# Patient Record
Sex: FEMALE | Race: WHITE | NOT HISPANIC OR LATINO | Employment: PART TIME | URBAN - METROPOLITAN AREA
[De-identification: names, ages, dates, MRNs, and addresses within clinical notes are randomized per-mention and may not be internally consistent; named-entity substitution may affect disease eponyms.]

---

## 2017-01-16 ENCOUNTER — AMBULATORY - HEALTHEAST (OUTPATIENT)
Dept: VASCULAR SURGERY | Facility: CLINIC | Age: 57
End: 2017-01-16

## 2017-01-16 DIAGNOSIS — E66.09 NON MORBID OBESITY DUE TO EXCESS CALORIES: ICD-10-CM

## 2017-01-16 DIAGNOSIS — I87.303 VENOUS HYPERTENSION OF BOTH LOWER EXTREMITIES: ICD-10-CM

## 2017-01-16 DIAGNOSIS — M79.89 LEFT LEG SWELLING: ICD-10-CM

## 2017-01-17 ENCOUNTER — COMMUNICATION - HEALTHEAST (OUTPATIENT)
Dept: SURGERY | Facility: CLINIC | Age: 57
End: 2017-01-17

## 2017-01-23 ENCOUNTER — COMMUNICATION - HEALTHEAST (OUTPATIENT)
Dept: SURGERY | Facility: CLINIC | Age: 57
End: 2017-01-23

## 2017-02-08 ENCOUNTER — COMMUNICATION - HEALTHEAST (OUTPATIENT)
Dept: VASCULAR SURGERY | Facility: CLINIC | Age: 57
End: 2017-02-08

## 2017-02-09 ENCOUNTER — AMBULATORY - HEALTHEAST (OUTPATIENT)
Dept: VASCULAR SURGERY | Facility: CLINIC | Age: 57
End: 2017-02-09

## 2017-02-09 DIAGNOSIS — M79.89 LEFT LEG SWELLING: ICD-10-CM

## 2017-02-09 DIAGNOSIS — I87.303 VENOUS HYPERTENSION OF BOTH LOWER EXTREMITIES: ICD-10-CM

## 2017-02-09 DIAGNOSIS — C50.919 MALIGNANT NEOPLASM OF BREAST (H): ICD-10-CM

## 2017-02-09 DIAGNOSIS — E28.2 BILATERAL POLYCYSTIC OVARIAN SYNDROME: ICD-10-CM

## 2017-02-09 DIAGNOSIS — E66.09 NON MORBID OBESITY DUE TO EXCESS CALORIES: ICD-10-CM

## 2017-04-12 ENCOUNTER — OFFICE VISIT - HEALTHEAST (OUTPATIENT)
Dept: SURGERY | Facility: CLINIC | Age: 57
End: 2017-04-12

## 2017-04-12 DIAGNOSIS — Z86.69 HX OF MIGRAINE HEADACHES: ICD-10-CM

## 2017-04-12 DIAGNOSIS — E66.01 OBESITY, CLASS III, BMI 40-49.9 (MORBID OBESITY) (H): ICD-10-CM

## 2017-04-12 DIAGNOSIS — Z87.19 HISTORY OF IBS: ICD-10-CM

## 2017-04-12 DIAGNOSIS — R00.0 TACHYCARDIA: ICD-10-CM

## 2017-04-12 DIAGNOSIS — E55.9 VITAMIN D DEFICIENCY: ICD-10-CM

## 2017-04-12 ASSESSMENT — MIFFLIN-ST. JEOR: SCORE: 1496.69

## 2017-04-20 ENCOUNTER — OFFICE VISIT - HEALTHEAST (OUTPATIENT)
Dept: SURGERY | Facility: CLINIC | Age: 57
End: 2017-04-20

## 2017-04-20 ENCOUNTER — AMBULATORY - HEALTHEAST (OUTPATIENT)
Dept: LAB | Facility: HOSPITAL | Age: 57
End: 2017-04-20

## 2017-04-20 DIAGNOSIS — Z87.19 HISTORY OF IBS: ICD-10-CM

## 2017-04-20 DIAGNOSIS — E66.01 OBESITY, CLASS III, BMI 40-49.9 (MORBID OBESITY) (H): ICD-10-CM

## 2017-04-20 DIAGNOSIS — R00.0 TACHYCARDIA: ICD-10-CM

## 2017-04-20 DIAGNOSIS — E66.9 OBESITY (BMI 30-39.9): ICD-10-CM

## 2017-04-20 DIAGNOSIS — Z71.3 NUTRITIONAL COUNSELING: ICD-10-CM

## 2017-04-20 DIAGNOSIS — E55.9 VITAMIN D DEFICIENCY: ICD-10-CM

## 2017-04-20 LAB
CHOLEST SERPL-MCNC: 250 MG/DL
FASTING STATUS PATIENT QL REPORTED: NO
HDLC SERPL-MCNC: 100 MG/DL
LDLC SERPL CALC-MCNC: 138 MG/DL
TRIGL SERPL-MCNC: 62 MG/DL

## 2017-04-20 ASSESSMENT — MIFFLIN-ST. JEOR: SCORE: 1456.77

## 2017-04-23 ENCOUNTER — COMMUNICATION - HEALTHEAST (OUTPATIENT)
Dept: SURGERY | Facility: CLINIC | Age: 57
End: 2017-04-23

## 2017-04-30 ENCOUNTER — COMMUNICATION - HEALTHEAST (OUTPATIENT)
Dept: SURGERY | Facility: CLINIC | Age: 57
End: 2017-04-30

## 2017-05-10 ENCOUNTER — OFFICE VISIT - HEALTHEAST (OUTPATIENT)
Dept: VASCULAR SURGERY | Facility: CLINIC | Age: 57
End: 2017-05-10

## 2017-05-10 DIAGNOSIS — C50.919 MALIGNANT NEOPLASM OF BREAST (H): ICD-10-CM

## 2017-05-10 DIAGNOSIS — M79.89 LEFT LEG SWELLING: ICD-10-CM

## 2017-05-10 DIAGNOSIS — I87.303 VENOUS HYPERTENSION OF BOTH LOWER EXTREMITIES: ICD-10-CM

## 2017-05-10 DIAGNOSIS — I87.2 VENOUS INSUFFICIENCY OF LEFT LEG: ICD-10-CM

## 2017-05-10 DIAGNOSIS — E66.09 NON MORBID OBESITY DUE TO EXCESS CALORIES: ICD-10-CM

## 2017-05-18 ENCOUNTER — OFFICE VISIT - HEALTHEAST (OUTPATIENT)
Dept: SURGERY | Facility: CLINIC | Age: 57
End: 2017-05-18

## 2017-05-18 DIAGNOSIS — E66.9 OBESITY WITH BODY MASS INDEX OF 30.0-39.9: ICD-10-CM

## 2017-05-18 DIAGNOSIS — Z71.3 NUTRITIONAL COUNSELING: ICD-10-CM

## 2017-05-18 ASSESSMENT — MIFFLIN-ST. JEOR: SCORE: 1448.15

## 2017-06-02 ENCOUNTER — OFFICE VISIT - HEALTHEAST (OUTPATIENT)
Dept: SURGERY | Facility: CLINIC | Age: 57
End: 2017-06-02

## 2017-06-02 DIAGNOSIS — Z71.3 WEIGHT LOSS COUNSELING, ENCOUNTER FOR: ICD-10-CM

## 2017-06-02 DIAGNOSIS — E66.09 NON MORBID OBESITY DUE TO EXCESS CALORIES: ICD-10-CM

## 2017-06-02 ASSESSMENT — MIFFLIN-ST. JEOR: SCORE: 1463.12

## 2017-06-22 ENCOUNTER — COMMUNICATION - HEALTHEAST (OUTPATIENT)
Dept: SURGERY | Facility: CLINIC | Age: 57
End: 2017-06-22

## 2017-08-16 ENCOUNTER — OFFICE VISIT - HEALTHEAST (OUTPATIENT)
Dept: SURGERY | Facility: CLINIC | Age: 57
End: 2017-08-16

## 2017-08-16 DIAGNOSIS — Z71.3 WEIGHT LOSS COUNSELING, ENCOUNTER FOR: ICD-10-CM

## 2017-08-16 DIAGNOSIS — E66.01 OBESITY, CLASS III, BMI 40-49.9 (MORBID OBESITY) (H): ICD-10-CM

## 2017-08-16 ASSESSMENT — MIFFLIN-ST. JEOR: SCORE: 1415.04

## 2017-12-14 ENCOUNTER — OFFICE VISIT - HEALTHEAST (OUTPATIENT)
Dept: VASCULAR SURGERY | Facility: CLINIC | Age: 57
End: 2017-12-14

## 2017-12-14 DIAGNOSIS — M79.671 FOOT PAIN, BILATERAL: ICD-10-CM

## 2017-12-14 DIAGNOSIS — E28.2 BILATERAL POLYCYSTIC OVARIAN SYNDROME: ICD-10-CM

## 2017-12-14 DIAGNOSIS — M72.2 PLANTAR FASCIITIS, BILATERAL: ICD-10-CM

## 2017-12-14 DIAGNOSIS — I87.2 VENOUS INSUFFICIENCY OF LEFT LEG: ICD-10-CM

## 2017-12-14 DIAGNOSIS — M79.672 FOOT PAIN, BILATERAL: ICD-10-CM

## 2017-12-14 DIAGNOSIS — M79.605 LEFT LEG PAIN: ICD-10-CM

## 2017-12-14 DIAGNOSIS — I73.9 CLAUDICATION (H): ICD-10-CM

## 2017-12-14 DIAGNOSIS — I87.303 VENOUS HYPERTENSION OF BOTH LOWER EXTREMITIES: ICD-10-CM

## 2017-12-14 ASSESSMENT — MIFFLIN-ST. JEOR: SCORE: 1410.5

## 2018-02-12 ENCOUNTER — COMMUNICATION - HEALTHEAST (OUTPATIENT)
Dept: VASCULAR SURGERY | Facility: CLINIC | Age: 58
End: 2018-02-12

## 2018-02-12 DIAGNOSIS — M79.89 LEFT LEG SWELLING: ICD-10-CM

## 2018-02-12 DIAGNOSIS — I87.303 VENOUS HYPERTENSION OF BOTH LOWER EXTREMITIES: ICD-10-CM

## 2018-02-12 DIAGNOSIS — C50.919 MALIGNANT NEOPLASM OF BREAST (H): ICD-10-CM

## 2018-02-12 DIAGNOSIS — E28.2 POLYCYSTIC OVARIAN SYNDROME: ICD-10-CM

## 2018-02-13 ENCOUNTER — RECORDS - HEALTHEAST (OUTPATIENT)
Dept: VASCULAR ULTRASOUND | Facility: CLINIC | Age: 58
End: 2018-02-13

## 2018-02-13 ENCOUNTER — COMMUNICATION - HEALTHEAST (OUTPATIENT)
Dept: VASCULAR SURGERY | Facility: CLINIC | Age: 58
End: 2018-02-13

## 2018-02-13 DIAGNOSIS — I87.2 VENOUS INSUFFICIENCY (CHRONIC) (PERIPHERAL): ICD-10-CM

## 2018-02-13 DIAGNOSIS — I87.303 CHRONIC VENOUS HYPERTENSION (IDIOPATHIC) WITHOUT COMPLICATIONS OF BILATERAL LOWER EXTREMITY: ICD-10-CM

## 2018-02-13 DIAGNOSIS — M72.2 PLANTAR FASCIAL FIBROMATOSIS: ICD-10-CM

## 2018-02-13 DIAGNOSIS — E28.2 POLYCYSTIC OVARIAN SYNDROME: ICD-10-CM

## 2018-02-13 DIAGNOSIS — I73.9 PERIPHERAL VASCULAR DISEASE, UNSPECIFIED (H): ICD-10-CM

## 2018-02-13 DIAGNOSIS — M79.672 PAIN IN LEFT FOOT: ICD-10-CM

## 2018-02-13 DIAGNOSIS — M79.605 PAIN IN LEFT LEG: ICD-10-CM

## 2018-02-13 DIAGNOSIS — M79.671 PAIN IN RIGHT FOOT: ICD-10-CM

## 2018-02-28 ENCOUNTER — RECORDS - HEALTHEAST (OUTPATIENT)
Dept: LAB | Facility: CLINIC | Age: 58
End: 2018-02-28

## 2018-02-28 LAB
ALBUMIN SERPL-MCNC: 3.6 G/DL (ref 3.5–5)
ALP SERPL-CCNC: 110 U/L (ref 45–120)
ALT SERPL W P-5'-P-CCNC: 20 U/L (ref 0–45)
ANION GAP SERPL CALCULATED.3IONS-SCNC: 8 MMOL/L (ref 5–18)
AST SERPL W P-5'-P-CCNC: 21 U/L (ref 0–40)
BILIRUB SERPL-MCNC: 0.3 MG/DL (ref 0–1)
BUN SERPL-MCNC: 14 MG/DL (ref 8–22)
CALCIUM SERPL-MCNC: 9.5 MG/DL (ref 8.5–10.5)
CHLORIDE BLD-SCNC: 105 MMOL/L (ref 98–107)
CO2 SERPL-SCNC: 27 MMOL/L (ref 22–31)
CREAT SERPL-MCNC: 0.72 MG/DL (ref 0.6–1.1)
GFR SERPL CREATININE-BSD FRML MDRD: >60 ML/MIN/1.73M2
GLUCOSE BLD-MCNC: 84 MG/DL (ref 70–125)
POTASSIUM BLD-SCNC: 4.7 MMOL/L (ref 3.5–5)
PROT SERPL-MCNC: 6.4 G/DL (ref 6–8)
SODIUM SERPL-SCNC: 140 MMOL/L (ref 136–145)
TSH SERPL DL<=0.005 MIU/L-ACNC: 1.12 UIU/ML (ref 0.3–5)

## 2018-03-01 ENCOUNTER — RECORDS - HEALTHEAST (OUTPATIENT)
Dept: LAB | Facility: CLINIC | Age: 58
End: 2018-03-01

## 2018-03-01 LAB — ERYTHROCYTE [SEDIMENTATION RATE] IN BLOOD BY WESTERGREN METHOD: 7 MM/HR (ref 0–20)

## 2018-03-23 ENCOUNTER — OFFICE VISIT - HEALTHEAST (OUTPATIENT)
Dept: SURGERY | Facility: CLINIC | Age: 58
End: 2018-03-23

## 2018-03-23 DIAGNOSIS — E66.01 OBESITY, CLASS III, BMI 40-49.9 (MORBID OBESITY) (H): ICD-10-CM

## 2018-03-23 DIAGNOSIS — G43.909 MIGRAINES: ICD-10-CM

## 2018-03-23 ASSESSMENT — MIFFLIN-ST. JEOR: SCORE: 1474.01

## 2018-05-11 ENCOUNTER — COMMUNICATION - HEALTHEAST (OUTPATIENT)
Dept: SURGERY | Facility: CLINIC | Age: 58
End: 2018-05-11

## 2018-05-16 ENCOUNTER — RECORDS - HEALTHEAST (OUTPATIENT)
Dept: LAB | Facility: CLINIC | Age: 58
End: 2018-05-16

## 2018-05-17 LAB — 25(OH)D3 SERPL-MCNC: 32.9 NG/ML (ref 30–80)

## 2019-06-11 ENCOUNTER — RECORDS - HEALTHEAST (OUTPATIENT)
Dept: LAB | Facility: CLINIC | Age: 59
End: 2019-06-11

## 2019-06-11 LAB — ERYTHROCYTE [SEDIMENTATION RATE] IN BLOOD BY WESTERGREN METHOD: 14 MM/HR (ref 0–20)

## 2019-06-12 LAB — LYME TOTAL ANTIBODY - HISTORICAL: 0.05 INDEX VALUE

## 2019-06-13 LAB — ANA SER QL: 0.2 U

## 2019-07-09 ENCOUNTER — RECORDS - HEALTHEAST (OUTPATIENT)
Dept: LAB | Facility: CLINIC | Age: 59
End: 2019-07-09

## 2019-07-11 LAB
GLIADIN IGA SER-ACNC: 0.4 U/ML
GLIADIN IGG SER-ACNC: <0.4 U/ML
IGA SERPL-MCNC: 204 MG/DL (ref 65–400)
TTG IGA SER-ACNC: 0.7 U/ML
TTG IGG SER-ACNC: 4.2 U/ML

## 2019-08-26 ENCOUNTER — TRANSFERRED RECORDS (OUTPATIENT)
Dept: HEALTH INFORMATION MANAGEMENT | Facility: CLINIC | Age: 59
End: 2019-08-26

## 2019-08-28 ENCOUNTER — MEDICAL CORRESPONDENCE (OUTPATIENT)
Dept: HEALTH INFORMATION MANAGEMENT | Facility: CLINIC | Age: 59
End: 2019-08-28

## 2019-09-03 ENCOUNTER — PRE VISIT (OUTPATIENT)
Dept: NEUROLOGY | Facility: CLINIC | Age: 59
End: 2019-09-03

## 2019-09-03 NOTE — TELEPHONE ENCOUNTER
FUTURE VISIT INFORMATION      FUTURE VISIT INFORMATION:    Date: 9/3/2019    Time: 8AM    [place] [facility] [place]ERRAL INFORMATION:    Referring provider:  Dr. Davis    Referring providers clinic:  [facility]     Reason for visit/diagnosis  Migraines     RECORDS REQUESTED FROM:       Clinic name Comments Records Status Imaging Status   [facility]   Scanned to Media N/A

## 2019-09-22 ENCOUNTER — DOCUMENTATION ONLY (OUTPATIENT)
Dept: CARE COORDINATION | Facility: CLINIC | Age: 59
End: 2019-09-22

## 2019-10-11 ASSESSMENT — ENCOUNTER SYMPTOMS
STIFFNESS: 0
RECTAL PAIN: 0
HEARTBURN: 0
NECK PAIN: 1
EYE IRRITATION: 1
SKIN CHANGES: 0
EYE REDNESS: 0
WHEEZING: 1
COUGH DISTURBING SLEEP: 1
POOR WOUND HEALING: 0
HEMATURIA: 0
MUSCLE WEAKNESS: 0
JAUNDICE: 0
FLANK PAIN: 0
ABDOMINAL PAIN: 0
ARTHRALGIAS: 1
BACK PAIN: 1
SPUTUM PRODUCTION: 0
SNORES LOUDLY: 1
DOUBLE VISION: 0
NAIL CHANGES: 0
DYSPNEA ON EXERTION: 0
SHORTNESS OF BREATH: 0
EYE WATERING: 1
DIARRHEA: 1
MYALGIAS: 1
DIFFICULTY URINATING: 0
EYE PAIN: 0
HEMOPTYSIS: 0
BLOATING: 1
BLOOD IN STOOL: 0
BOWEL INCONTINENCE: 0
POSTURAL DYSPNEA: 0
MUSCLE CRAMPS: 1
JOINT SWELLING: 0
DYSURIA: 0
VOMITING: 0
NAUSEA: 1
CONSTIPATION: 0
COUGH: 1

## 2019-10-11 ASSESSMENT — HEADACHE IMPACT TEST (HIT 6)
HOW OFTEN HAVE YOU FELT TOO TIRED TO WORK BECAUSE OF YOUR HEADACHES: SOMETIMES
HIT6 TOTAL SCORE: 63
WHEN YOU HAVE HEADACHES HOW OFTEN IS THE PAIN SEVERE: VERY OFTEN
WHEN YOU HAVE A HEADACHE HOW OFTEN DO YOU WISH YOU COULD LIE DOWN: VERY OFTEN
HOW OFTEN DO HEADACHES LIMIT YOUR DAILY ACTIVITIES: SOMETIMES
HOW OFTEN HAVE YOU FELT FED UP OR IRRITATED BECAUSE OF YOUR HEADACHES: VERY OFTEN
HOW OFTEN DID HEADACHS LIMIT CONCENTRATION ON WORK OR DAILY ACTIVITY: SOMETIMES

## 2019-10-22 ENCOUNTER — OFFICE VISIT (OUTPATIENT)
Dept: NEUROLOGY | Facility: CLINIC | Age: 59
End: 2019-10-22
Payer: MEDICARE

## 2019-10-22 VITALS
RESPIRATION RATE: 16 BRPM | OXYGEN SATURATION: 95 % | WEIGHT: 227 LBS | SYSTOLIC BLOOD PRESSURE: 113 MMHG | BODY MASS INDEX: 42.86 KG/M2 | HEART RATE: 86 BPM | HEIGHT: 61 IN | DIASTOLIC BLOOD PRESSURE: 77 MMHG

## 2019-10-22 DIAGNOSIS — M79.18 CHRONIC MYOFASCIAL PAIN: Primary | ICD-10-CM

## 2019-10-22 DIAGNOSIS — G89.29 CHRONIC MYOFASCIAL PAIN: Primary | ICD-10-CM

## 2019-10-22 PROBLEM — M72.2 PLANTAR FASCIITIS, BILATERAL: Status: ACTIVE | Noted: 2017-12-14

## 2019-10-22 PROBLEM — M79.672 FOOT PAIN, BILATERAL: Status: ACTIVE | Noted: 2017-12-14

## 2019-10-22 PROBLEM — M79.605 LEFT LEG PAIN: Status: ACTIVE | Noted: 2017-12-14

## 2019-10-22 PROBLEM — D05.10 DCIS (DUCTAL CARCINOMA IN SITU): Status: ACTIVE | Noted: 2019-10-22

## 2019-10-22 PROBLEM — I73.9 CLAUDICATION (H): Status: ACTIVE | Noted: 2017-12-14

## 2019-10-22 PROBLEM — Z98.890 S/P LUMPECTOMY OF BREAST: Status: ACTIVE | Noted: 2019-10-22

## 2019-10-22 PROBLEM — F17.200 SMOKER: Status: ACTIVE | Noted: 2019-10-22

## 2019-10-22 PROBLEM — M79.671 FOOT PAIN, BILATERAL: Status: ACTIVE | Noted: 2017-12-14

## 2019-10-22 RX ORDER — EPINEPHRINE 0.3 MG/.3ML
0.3 INJECTION SUBCUTANEOUS PRN
COMMUNITY

## 2019-10-22 RX ORDER — MONTELUKAST SODIUM 10 MG/1
10 TABLET ORAL
COMMUNITY
Start: 2018-10-03 | End: 2022-03-22

## 2019-10-22 RX ORDER — DIPHENHYDRAMINE HCL 50 MG
CAPSULE ORAL
COMMUNITY
Start: 2018-12-06

## 2019-10-22 RX ORDER — SUMATRIPTAN 50 MG/1
50-100 TABLET, FILM COATED ORAL
Qty: 12 TABLET | Refills: 6 | Status: SHIPPED | OUTPATIENT
Start: 2019-10-22 | End: 2020-11-11

## 2019-10-22 RX ORDER — CYANOCOBALAMIN 1000 UG/ML
1000 INJECTION, SOLUTION INTRAMUSCULAR; SUBCUTANEOUS
COMMUNITY
End: 2022-03-22

## 2019-10-22 RX ORDER — BENZONATATE 100 MG/1
CAPSULE ORAL
Refills: 0 | COMMUNITY
Start: 2019-10-04 | End: 2022-03-22

## 2019-10-22 RX ORDER — LATANOPROST 50 UG/ML
SOLUTION/ DROPS OPHTHALMIC
COMMUNITY
Start: 2018-09-08 | End: 2022-03-22

## 2019-10-22 ASSESSMENT — PAIN SCALES - GENERAL: PAINLEVEL: MILD PAIN (3)

## 2019-10-22 ASSESSMENT — PATIENT HEALTH QUESTIONNAIRE - PHQ9
10. IF YOU CHECKED OFF ANY PROBLEMS, HOW DIFFICULT HAVE THESE PROBLEMS MADE IT FOR YOU TO DO YOUR WORK, TAKE CARE OF THINGS AT HOME, OR GET ALONG WITH OTHER PEOPLE: SOMEWHAT DIFFICULT
SUM OF ALL RESPONSES TO PHQ QUESTIONS 1-9: 6
SUM OF ALL RESPONSES TO PHQ QUESTIONS 1-9: 6

## 2019-10-22 ASSESSMENT — MIFFLIN-ST. JEOR: SCORE: 1547.05

## 2019-10-22 NOTE — NURSING NOTE
Chief Complaint   Patient presents with     Headache     [facility] NEW HEADACHE- MIGRAINE CONSULT       Anders King, EMT

## 2019-10-22 NOTE — PATIENT INSTRUCTIONS
Plan:  Headache log for frequency and severity  Physiatry referral for trigger points injections  Physical therapy for myofascial release  Acute headache treatment-naproxen 1-2 tablets every 12 hours as needed. Limit use to no more than 10 days per month. Limit all of your acute medications use OTC to no more than 10 days per month  Sumatriptan injectable or sumatriptan oral tablet  mg at headache onset and may repeat in 2 hours as needed. Max 200 mg in 24 hoursas needed for acute migraine treatment. Limit use to no more than 8 days per month. May take it with naproxen in combination.   Migraine prevention-let me know if you would like to try new treatment-Aimovig or Emgality  Follow up in 3 months or sooner if needed      Patient Education     Sumatriptan tablets  Brand Names: Imitrex, Migraine Pack  What is this medicine?  SUMATRIPTAN (heidi ma TRIP tan) is used to treat migraines with or without aura. An aura is a strange feeling or visual disturbance that warns you of an attack. It is not used to prevent migraines.  How should I use this medicine?  Take this medicine by mouth with a glass of water. Follow the directions on the prescription label. This medicine is taken at the first symptoms of a migraine. It is not for everyday use. If your migraine headache returns after one dose, you can take another dose as directed. You must leave at least 2 hours between doses, and do not take more than 100 mg as a single dose. Do not take more than 200 mg total in any 24 hour period. If there is no improvement at all after the first dose, do not take a second dose without talking to your doctor or health care professional. Do not take your medicine more often than directed.  Talk to your pediatrician regarding the use of this medicine in children. Special care may be needed.  What side effects may I notice from receiving this medicine?  Side effects that you should report to your doctor or health care professional as  soon as possible:    allergic reactions like skin rash, itching or hives, swelling of the face, lips, or tongue    bloody or watery diarrhea    hallucination, loss of contact with reality    pain, tingling, numbness in the face, hands, or feet    seizures    signs and symptoms of a blood clot such as breathing problems; changes in vision; chest pain; severe, sudden headache; pain, swelling, warmth in the leg; trouble speaking; sudden numbness or weakness of the face, arm, or leg    signs and symptoms of a dangerous change in heartbeat or heart rhythm like chest pain; dizziness; fast or irregular heartbeat; palpitations, feeling faint or lightheaded; falls; breathing problems    signs and symptoms of a stroke like changes in vision; confusion; trouble speaking or understanding; severe headaches; sudden numbness or weakness of the face, arm, or leg; trouble walking; dizziness; loss of balance or coordination    stomach pain  Side effects that usually do not require medical attention (report to your doctor or health care professional if they continue or are bothersome):    changes in taste    facial flushing    headache    muscle cramps    muscle pain    nausea, vomiting    weak or tired  What may interact with this medicine?  Do not take this medicine with any of the following medicines:    cocaine    ergot alkaloids like dihydroergotamine, ergonovine, ergotamine, methylergonovine    feverfew    MAOIs like Carbex, Eldepryl, Marplan, Nardil, and Parnate    other medicines for migraine headache like almotriptan, eletriptan, frovatriptan, naratriptan, rizatriptan, zolmitriptan    tryptophan  This medicine may also interact with the following medications:    certain medicines for depression, anxiety, or psychotic disturbances  What if I miss a dose?  This does not apply; this medicine is not for regular use.  Where should I keep my medicine?  Keep out of the reach of children.  Store at room temperature between 2 and 30  degrees C (36 and 86 degrees F). Throw away any unused medicine after the expiration date.  What should I tell my health care provider before I take this medicine?  They need to know if you have any of these conditions:    circulation problems in fingers and toes    diabetes    heart disease    high blood pressure    high cholesterol    history of irregular heartbeat    history of stroke    kidney disease    liver disease    postmenopausal or surgical removal of uterus and ovaries    seizures    smoke tobacco    stomach or intestine problems    an unusual or allergic reaction to sumatriptan, other medicines, foods, dyes, or preservatives    pregnant or trying to get pregnant    breast-feeding  What should I watch for while using this medicine?  Only take this medicine for a migraine headache. Take it if you get warning symptoms or at the start of a migraine attack. It is not for regular use to prevent migraine attacks.  You may get drowsy or dizzy. Do not drive, use machinery, or do anything that needs mental alertness until you know how this medicine affects you. To reduce dizzy or fainting spells, do not sit or stand up quickly, especially if you are an older patient. Alcohol can increase drowsiness, dizziness and flushing. Avoid alcoholic drinks.  Smoking cigarettes may increase the risk of heart-related side effects from using this medicine.  If you take migraine medicines for 10 or more days a month, your migraines may get worse. Keep a diary of headache days and medicine use. Contact your healthcare professional if your migraine attacks occur more frequently.  NOTE:This sheet is a summary. It may not cover all possible information. If you have questions about this medicine, talk to your doctor, pharmacist, or health care provider. Copyright  2019 [employer]
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
## 2019-10-23 ENCOUNTER — RECORDS - HEALTHEAST (OUTPATIENT)
Dept: LAB | Facility: CLINIC | Age: 59
End: 2019-10-23

## 2019-10-23 ASSESSMENT — PATIENT HEALTH QUESTIONNAIRE - PHQ9
SUM OF ALL RESPONSES TO PHQ QUESTIONS 1-9: 6
SUM OF ALL RESPONSES TO PHQ QUESTIONS 1-9: 9
10. IF YOU CHECKED OFF ANY PROBLEMS, HOW DIFFICULT HAVE THESE PROBLEMS MADE IT FOR YOU TO DO YOUR WORK, TAKE CARE OF THINGS AT HOME, OR GET ALONG WITH OTHER PEOPLE: SOMEWHAT DIFFICULT
SUM OF ALL RESPONSES TO PHQ QUESTIONS 1-9: 9

## 2019-10-23 ASSESSMENT — ANXIETY QUESTIONNAIRES
2. NOT BEING ABLE TO STOP OR CONTROL WORRYING: SEVERAL DAYS
4. TROUBLE RELAXING: NEARLY EVERY DAY
7. FEELING AFRAID AS IF SOMETHING AWFUL MIGHT HAPPEN: NOT AT ALL
7. FEELING AFRAID AS IF SOMETHING AWFUL MIGHT HAPPEN: NOT AT ALL
3. WORRYING TOO MUCH ABOUT DIFFERENT THINGS: SEVERAL DAYS
5. BEING SO RESTLESS THAT IT IS HARD TO SIT STILL: NOT AT ALL
GAD7 TOTAL SCORE: 9
6. BECOMING EASILY ANNOYED OR IRRITABLE: MORE THAN HALF THE DAYS
1. FEELING NERVOUS, ANXIOUS, OR ON EDGE: MORE THAN HALF THE DAYS

## 2019-10-24 LAB — HBA1C MFR BLD: 5.4 % (ref 4.2–6.1)

## 2019-10-24 ASSESSMENT — ANXIETY QUESTIONNAIRES: GAD7 TOTAL SCORE: 9

## 2019-10-24 ASSESSMENT — PATIENT HEALTH QUESTIONNAIRE - PHQ9: SUM OF ALL RESPONSES TO PHQ QUESTIONS 1-9: 9

## 2019-10-25 ENCOUNTER — OFFICE VISIT (OUTPATIENT)
Dept: PHYSICAL MEDICINE AND REHAB | Facility: CLINIC | Age: 59
End: 2019-10-25
Payer: MEDICARE

## 2019-10-25 VITALS
BODY MASS INDEX: 42.86 KG/M2 | WEIGHT: 227 LBS | HEART RATE: 78 BPM | RESPIRATION RATE: 16 BRPM | HEIGHT: 61 IN | SYSTOLIC BLOOD PRESSURE: 141 MMHG | DIASTOLIC BLOOD PRESSURE: 90 MMHG | OXYGEN SATURATION: 99 %

## 2019-10-25 DIAGNOSIS — M79.18 MYOFASCIAL PAIN: Primary | ICD-10-CM

## 2019-10-25 RX ORDER — TRIAMCINOLONE ACETONIDE 40 MG/ML
40 INJECTION, SUSPENSION INTRA-ARTICULAR; INTRAMUSCULAR ONCE
Status: COMPLETED | OUTPATIENT
Start: 2019-10-25 | End: 2019-10-25

## 2019-10-25 RX ORDER — BUPIVACAINE HYDROCHLORIDE 5 MG/ML
5 INJECTION, SOLUTION EPIDURAL; INTRACAUDAL ONCE
Status: COMPLETED | OUTPATIENT
Start: 2019-10-25 | End: 2019-10-25

## 2019-10-25 RX ADMIN — BUPIVACAINE HYDROCHLORIDE 25 MG: 5 INJECTION, SOLUTION EPIDURAL; INTRACAUDAL at 15:20

## 2019-10-25 RX ADMIN — TRIAMCINOLONE ACETONIDE 40 MG: 40 INJECTION, SUSPENSION INTRA-ARTICULAR; INTRAMUSCULAR at 15:21

## 2019-10-25 ASSESSMENT — MIFFLIN-ST. JEOR: SCORE: 1547.05

## 2019-10-25 ASSESSMENT — PAIN SCALES - GENERAL: PAINLEVEL: SEVERE PAIN (7)

## 2019-10-25 NOTE — NURSING NOTE
Chief Complaint   Patient presents with     Consult     [facility] NEW TRIGGER POINT INJECTIONS- CHRONIC MYOFACIAL PAIN       Anders King, EMT

## 2019-10-25 NOTE — PROGRESS NOTES
"[facility]  Physical Medicine & Rehabilitation Consultation    Snow Hawthorne   YOB: 1960  MRN: [id number]   Date of Service: 10/25/19    Requesting Provider: DELORIS Lackey CNP      History of Present Illness:  Snow Hawthorne is a 59 year old female with a history of chronic migraine headaches who is referred to clinic for consideration of trigger point injections for management of myofascial pain in her neck and shoulder muscles. Patient feels that her migraines stem from excessive muscle tension/tightness in her neck and shoulder muscles. Her left shoulder muscles are always more tight and painful than her right side.     Patient has had oral steroids for myofascial pain, but felt that this made her \"bitchy.\" She has had Dysport injections in the past for management of her migraines, and unfortunately had a negative reaction which included nausea, vomiting and malaise. Other treatment modalities tried include massage therapy, acupuncture and chiropractic adjustments without any lasting benefit.     Past Medical History:  Chronic migraine headaches  Asthma  Claudication  Obesity  Venous hypertension, BLE  Major depression      Social History     Socioeconomic History     Marital status:      Spouse name: Not on file     Number of children: Not on file     Years of education: Not on file     Highest education level: Not on file   Occupational History     Not on file   Social Needs     Financial resource strain: Not on file     Food insecurity:     Worry: Not on file     Inability: Not on file     Transportation needs:     Medical: Not on file     Non-medical: Not on file   Tobacco Use     Smoking status: Former Smoker     Packs/day: 1.00     Years: 20.00     Pack years: 20.00     Smokeless tobacco: Never Used     Tobacco comment: quit 2 mo ago   Substance and Sexual Activity     Alcohol use: Not Currently     Drug " use: Not Currently     Sexual activity: Not on file   Lifestyle     Physical activity:     Days per week: Not on file     Minutes per session: Not on file     Stress: Not on file   Relationships     Social connections:     Talks on phone: Not on file     Gets together: Not on file     Attends Christian service: Not on file     Active member of club or organization: Not on file     Attends meetings of clubs or organizations: Not on file     Relationship status: Not on file     Intimate partner violence:     Fear of current or ex partner: Not on file     Emotionally abused: Not on file     Physically abused: Not on file     Forced sexual activity: Not on file   Other Topics Concern     Not on file   Social History Narrative     Not on file       Current Outpatient Medications   Medication Sig Dispense Refill     benzonatate (TESSALON) 100 MG capsule TAKE 1 CAPSULE BY MOUTH THREE TIMES A DAY  0     cetirizine HCl 10 MG CAPS Takes one capsule daily.       cyanocobalamin (CYANOCOBALAMIN) 1000 MCG/ML injection Inject 1,000 mcg into the muscle       diphenhydrAMINE (BENADRYL) 50 MG capsule Take 1 tablet (50 mg) 1 hour prior to your CT scan.       EFFEXOR 75 MG OR TABS  5 at hs (Patient taking differently: 225 mg ) 60 0     EPINEPHrine (EPIPEN/ADRENACLICK/OR ANY BX GENERIC EQUIV) 0.3 MG/0.3ML injection 2-pack Inject 0.3 mg into the muscle as needed       fluticasone (VERAMYST) 27.5 MCG/SPRAY nasal spray Spray 2 sprays in nostril       IMITREX STATDOSE 6 MG/0.5ML SC KIT 1 at onset, repeat in 1 hr prn  0     KETOROLAC 10 MG OR TABS 1 TABLET EVERY 4 TO 6 HOURS AS NEEDED 20 0     latanoprost (XALATAN) 0.005 % ophthalmic solution INSTILL ONE DROP IN EACH EYE AT BEDTIME       montelukast (SINGULAIR) 10 MG tablet Take 10 mg by mouth       Riboflavin-Magnesium-Feverfew (MIGRELIEF) 200-180-50 MG TABS        SUMAtriptan (IMITREX) 50 MG tablet Take 1-2 tablets ( mg) by mouth at onset of headache for migraine (may repeat in 2  "hours as needed. Max 200 mg in 24 hours) 12 tablet 6     tiZANidine (ZANAFLEX) 4 MG tablet PLEASE SEE ATTACHED FOR DETAILED DIRECTIONS  3     COMPAZINE 10 MG OR TABS 1 TABLET 3 TIMES DAILY AS NEEDED (Patient not taking: Reported on 10/22/2019) 10 0         Allergies   Allergen Reactions     Atenolol Anaphylaxis and Other (See Comments)     Hypotension       Diatrizoate Anaphylaxis     Other reaction(s): *Unknown  IV Contrast Dye       Nitrofurantoin Anaphylaxis     Macrobid       Ciprofloxacin Other (See Comments)     \"becomes irritable\"  \"becomes irritable\"       Clotrimazole Itching     Contrast Dye      IVP     Metronidazole Other (See Comments)     Itching inside     Penicillins      Silver Nitrate Other (See Comments)     Other reaction(s): Irritation At Inj Site  Causes skin infection       Sulfa Drugs      Adhesive Tape Itching and Rash       Physical Examination:  VS: BP (!) 141/90   Pulse 78   Resp 16   Ht 1.549 m (5' 1\")   Wt 103 kg (227 lb)   LMP 10/12/2003   SpO2 99%   BMI 42.89 kg/m       Pleasant and cooperative, in no acute distress  No lesions or abrasions on exposed skin  Myofascial trigger points noted in bilateral upper trapezius muscles, L>R      Procedure:     Drug:   Bupivacaine 0.5%: 5 ml (Lot: [id number], Exp: 2022, NDC: 55150-169-10)  Kenalo mg = 1 ml (Lot: [id number], Exp: 2021, NDC: [address]-8)    Prior to the start of the procedure and with procedural staff participation, I verbally confirmed the patient s identity using two indicators, relevant allergies, that the procedure was appropriate and matched the consent or emergent situation, and that the correct equipment/implants were available. Immediately prior to starting the procedure I conducted the Time Out with the procedural staff and re-confirmed the patient s name, procedure, and site/side. (The Joint Commission universal protocol was followed.)  Yes    Sedation (Moderate or Deep): None      Trigger Point " Injections    Areas were prepped with ChloraPrep.  Using standard precautions a 25 mm EMG needle with EMG guidance was used to inject a mixture of 0.5% bupivacaine and kenalog into the left trapezius muscles for a total of 12 site/s, and the right trapezius muscles for a total of 3 site/s.  Patient tolerated the procedure well and there were no complications.        Snow Hawthorne tolerated the procedure well without any immediate complications. She was allowed to recover for an appropriate period of time and was discharged home in stable condition.  Patient will follow-up regarding response to this procedure.      Assessment & Recommendations:  Snow Hawthorne is a 59 year old female who presents to rehabilitation clinic for trigger point injections for management of myofascial pain in her neck and shoulder girdle musculature.  Snow Hawthorne tolerated the procedure well and noted reduction in baseline pain following the procedure. We did discuss at today's visit that if the patient experiences resolution or improvement in pain that lasts > 1 month, we may repeat these injections no sooner than 3 months from today.         Thank you for this consultation. Please do not hesitate to contact me with further questions.   Lakisha Luque MD  Physical Medicine and Rehabilitation  [phone]      I spent a total of 45 minutes face-to-face and managing the care of Snow Hawthorne. Over 50% of my time was spent counseling the patient and coordinating care. Please see note for details.

## 2019-10-25 NOTE — LETTER
"10/25/2019       RE: Snow Hawthorne  [address] [place] [id number]     Dear Colleague,    Thank you for referring your patient, Snow Hawthorne, to the [facility] PHYSICAL MEDICINE AND REHABILITATION at [facility]. Please see a copy of my visit note below.    [facility]  Clinics & [facility] Center  Physical Medicine & Rehabilitation Consultation    Snow Hawthorne   YOB: 1960  MRN: [id number]   Date of Service: 10/25/19    Requesting Provider: DELORIS Lackey CNP      History of Present Illness:  Snow Hawthorne is a 59 year old female with a history of chronic migraine headaches who is referred to clinic for consideration of trigger point injections for management of myofascial pain in her neck and shoulder muscles. Patient feels that her migraines stem from excessive muscle tension/tightness in her neck and shoulder muscles. Her left shoulder muscles are always more tight and painful than her right side.     Patient has had oral steroids for myofascial pain, but felt that this made her \"bitchy.\" She has had Dysport injections in the past for management of her migraines, and unfortunately had a negative reaction which included nausea, vomiting and malaise. Other treatment modalities tried include massage therapy, acupuncture and chiropractic adjustments without any lasting benefit.     Past Medical History:  Chronic migraine headaches  Asthma  Claudication  Obesity  Venous hypertension, BLE  Major depression      Social History     Socioeconomic History     Marital status:      Spouse name: Not on file     Number of children: Not on file     Years of education: Not on file     Highest education level: Not on file   Occupational History     Not on file   Social Needs     Financial resource strain: Not on file     Food insecurity:     Worry: Not on file     Inability: Not on file "     Transportation needs:     Medical: Not on file     Non-medical: Not on file   Tobacco Use     Smoking status: Former Smoker     Packs/day: 1.00     Years: 20.00     Pack years: 20.00     Smokeless tobacco: Never Used     Tobacco comment: quit 2 mo ago   Substance and Sexual Activity     Alcohol use: Not Currently     Drug use: Not Currently     Sexual activity: Not on file   Lifestyle     Physical activity:     Days per week: Not on file     Minutes per session: Not on file     Stress: Not on file   Relationships     Social connections:     Talks on phone: Not on file     Gets together: Not on file     Attends Zoroastrianism service: Not on file     Active member of club or organization: Not on file     Attends meetings of clubs or organizations: Not on file     Relationship status: Not on file     Intimate partner violence:     Fear of current or ex partner: Not on file     Emotionally abused: Not on file     Physically abused: Not on file     Forced sexual activity: Not on file   Other Topics Concern     Not on file   Social History Narrative     Not on file       Current Outpatient Medications   Medication Sig Dispense Refill     benzonatate (TESSALON) 100 MG capsule TAKE 1 CAPSULE BY MOUTH THREE TIMES A DAY  0     cetirizine HCl 10 MG CAPS Takes one capsule daily.       cyanocobalamin (CYANOCOBALAMIN) 1000 MCG/ML injection Inject 1,000 mcg into the muscle       diphenhydrAMINE (BENADRYL) 50 MG capsule Take 1 tablet (50 mg) 1 hour prior to your CT scan.       EFFEXOR 75 MG OR TABS  5 at hs (Patient taking differently: 225 mg ) 60 0     EPINEPHrine (EPIPEN/ADRENACLICK/OR ANY BX GENERIC EQUIV) 0.3 MG/0.3ML injection 2-pack Inject 0.3 mg into the muscle as needed       fluticasone (VERAMYST) 27.5 MCG/SPRAY nasal spray Spray 2 sprays in nostril       IMITREX STATDOSE 6 MG/0.5ML SC KIT 1 at onset, repeat in 1 hr prn  0     KETOROLAC 10 MG OR TABS 1 TABLET EVERY 4 TO 6 HOURS AS NEEDED 20 0     latanoprost (XALATAN)  "0.005 % ophthalmic solution INSTILL ONE DROP IN EACH EYE AT BEDTIME       montelukast (SINGULAIR) 10 MG tablet Take 10 mg by mouth       Riboflavin-Magnesium-Feverfew (MIGRELIEF) 200-180-50 MG TABS        SUMAtriptan (IMITREX) 50 MG tablet Take 1-2 tablets ( mg) by mouth at onset of headache for migraine (may repeat in 2 hours as needed. Max 200 mg in 24 hours) 12 tablet 6     tiZANidine (ZANAFLEX) 4 MG tablet PLEASE SEE ATTACHED FOR DETAILED DIRECTIONS  3     COMPAZINE 10 MG OR TABS 1 TABLET 3 TIMES DAILY AS NEEDED (Patient not taking: Reported on 10/22/2019) 10 0         Allergies   Allergen Reactions     Atenolol Anaphylaxis and Other (See Comments)     Hypotension       Diatrizoate Anaphylaxis     Other reaction(s): *Unknown  IV Contrast Dye       Nitrofurantoin Anaphylaxis     Macrobid       Ciprofloxacin Other (See Comments)     \"becomes irritable\"  \"becomes irritable\"       Clotrimazole Itching     Contrast Dye      IVP     Metronidazole Other (See Comments)     Itching inside     Penicillins      Silver Nitrate Other (See Comments)     Other reaction(s): Irritation At Inj Site  Causes skin infection       Sulfa Drugs      Adhesive Tape Itching and Rash       Physical Examination:  VS: BP (!) 141/90   Pulse 78   Resp 16   Ht 1.549 m (5' 1\")   Wt 103 kg (227 lb)   LMP 10/12/2003   SpO2 99%   BMI 42.89 kg/m        Pleasant and cooperative, in no acute distress  No lesions or abrasions on exposed skin  Myofascial trigger points noted in bilateral upper trapezius muscles, L>R      Procedure:     Drug:   Bupivacaine 0.5%: 5 ml (Lot: [id number], Exp: 2022, NDC: 55150-169-10)  [place] mg = 1 ml (Lot: [id number], Exp: 2021, NDC: [address]-7)    Prior to the start of the procedure and with procedural staff participation, I verbally confirmed the patient s identity using two indicators, relevant allergies, that the procedure was appropriate and matched the consent or emergent situation, and that " the correct equipment/implants were available. Immediately prior to starting the procedure I conducted the Time Out with the procedural staff and re-confirmed the patient s name, procedure, and site/side. (The Joint Commission universal protocol was followed.)  Yes    Sedation (Moderate or Deep): None      Trigger Point Injections    Areas were prepped with ChloraPrep.  Using standard precautions a 25 mm EMG needle with EMG guidance was used to inject a mixture of 0.5% bupivacaine and kenalog into the left trapezius muscles for a total of 12 site/s, and the right trapezius muscles for a total of 3 site/s.  Patient tolerated the procedure well and there were no complications.        Snow Hawthorne tolerated the procedure well without any immediate complications. She was allowed to recover for an appropriate period of time and was discharged home in stable condition.  Patient will follow-up regarding response to this procedure.      Assessment & Recommendations:  Snow Hawthorne is a 59 year old female who presents to rehabilitation clinic for trigger point injections for management of myofascial pain in her neck and shoulder girdle musculature.  Snow Hawthorne tolerated the procedure well and noted reduction in baseline pain following the procedure. We did discuss at today's visit that if the patient experiences resolution or improvement in pain that lasts > 1 month, we may repeat these injections no sooner than 3 months from today.     Thank you for this consultation. Please do not hesitate to contact me with further questions.   Lakisha Luque MD  Physical Medicine and Rehabilitation  [phone]    I spent a total of 45 minutes face-to-face and managing the care of Snow Hawthorne. Over 50% of my time was spent counseling the patient and coordinating care. Please see note for details.

## 2019-10-26 ENCOUNTER — HEALTH MAINTENANCE LETTER (OUTPATIENT)
Age: 59
End: 2019-10-26

## 2019-10-28 ENCOUNTER — THERAPY VISIT (OUTPATIENT)
Dept: PHYSICAL THERAPY | Facility: CLINIC | Age: 59
End: 2019-10-28
Payer: MEDICARE

## 2019-10-28 DIAGNOSIS — M54.2 NECK PAIN: ICD-10-CM

## 2019-10-28 PROCEDURE — 97110 THERAPEUTIC EXERCISES: CPT | Mod: GP | Performed by: PHYSICAL THERAPIST

## 2019-10-28 PROCEDURE — 97161 PT EVAL LOW COMPLEX 20 MIN: CPT | Mod: GP | Performed by: PHYSICAL THERAPIST

## 2019-10-28 NOTE — LETTER
DEPARTMENT OF HEALTH AND HUMAN SERVICES  CENTERS FOR MEDICARE & MEDICAID SERVICES    PLAN/UPDATED PLAN OF PROGRESS FOR OUTPATIENT REHABILITATION    PATIENTS NAME:  Snow Koch   : 1960  PROVIDER NUMBER:    [id number]  HICN:[id number]    PROVIDER NAME: [facility]  MEDICAL RECORD NUMBER: [id number]   START OF CARE DATE:  SOC Date: 10/28/19   TYPE:  PT  PRIMARY/TREATMENT DIAGNOSIS: (Pertinent Medical Diagnosis)  Neck pain  VISITS FROM START OF CARE:  Rxs Used: 1     [facility] Initial Evaluation  Subjective:  The history is provided by the patient. No  was used. Snow Hawthorne being seen for neck pain & headaches. Problem began 10/25/2019 (date of orders). Where condition occurred: for unknown reasons.Problem occurred: unknown cause  and reported as 6/10 on pain scale. General health as reported by patient is good. Pertinent medical history includes:  Cancer, depression, migraines/headaches and overweight.  Medical allergies: adhesives and other. Other medical allergies details: see chart.  Surgeries include:  Cancer surgery.  Current medications:  Anti-depressants, muscle relaxants, sleep medication, steroids and other. Other medications details: Natural.   Primary job tasks include:  Driving, lifting/carrying and repetitive tasks.  Pain is described as aching, burning, sharp and stabbing and is constant. Pain is the same all the time. Since onset symptoms are gradually improving.  Previous treatment includes physical therapy, chiropractic and other. There was moderate improvement following previous treatment.   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 This is a chronic condition   Problem details: Patient reports history  of neck pain and headaches for many years with no precipitating event at onset. She reports that she had injections on Friday and has been feeling better since then. .   Patient reports pain:  Cervical left side and cervical right side (L>R). Radiates to:  Head, upper arm left, lower arm left and other (upper back). Associated symptoms:  Headache, dizziness and loss of motion/stiffness. Symptoms are exacerbated by certain positions, driving, lifting and other (cold weather) and relieved by other (injections).    Objective:  System  Physical Exam  [place] Cervical Evaluation  Posture:  Sitting: fair  Standing: good  Protruding Head: no  Wry Neck: no  Correction of Posture: no effect  Movement Loss:  Protrusion (PRO): nil and pain  Flexion (Flex): nil and pain  Retraction (RET): mod and pain  Extension (EXT): mod and pain  Lateral Flexion Right (LF R): min and pain  Lateral Flexion Left (LF L): min and pain  Rotation Right (ROT R): min and pain  Rotation Left (ROT L): mod and pain  Test Movements:  RET: During: increases  After: no worse    Repeat RET: During: increases  After: no worse  Mechanical Response: IncROM    Assessment/Plan:    Patient is a 59 year old female with cervical complaints.    Patient has the following significant findings with corresponding treatment plan.                Diagnosis 1:  Neck pain/headaches  Pain -  self management, education, directional preference exercise and home program  Decreased ROM/flexibility - manual therapy, therapeutic exercise and home program  Impaired muscle performance - neuro re-education and home program  Decreased function - therapeutic activities and home program  Impaired posture - neuro re-education and home program  Therapy Evaluation Codes:   Cumulative Therapy Evaluation is: Low complexity.  Previous and current functional limitations:  (See Goal Flow Sheet for this information)    Short term and Long term goals: (See Goal Flow Sheet for this information)  "  Communication ability:  Patient appears to be able to clearly communicate and understand verbal and written communication and follow directions correctly.  Treatment Explanation - The following has been discussed with the patient:   RX ordered/plan of care  Anticipated outcomes  Possible risks and side effects  This patient would benefit from PT intervention to resume normal activities.   Rehab potential is good.  Frequency:  1 X week, once daily  Duration:  for 6 weeks  Discharge Plan:  Achieve all LTG.  Independent in home treatment program.  Reach maximal therapeutic benefit.      Caregiver Signature/Credentials _____________________________ Date ________        Brandon Diaz DPT   I have reviewed and certified the need for these services and plan of treatment while under my care.      PHYSICIAN'S SIGNATURE:   _________________________________________  Date___________  Francesca Sandy CNP    Certification period:  Beginning of Cert date period: 10/28/19 to  End of Cert period date: 01/06/20   Functional Level Progress Report: Please see attached \"Goal Flow sheet for Functional level.\"  ____X____ Continue Services or       ________ DC Services                Service dates: From  SOC Date: 10/28/19 date to present                         "

## 2019-10-28 NOTE — PROGRESS NOTES
[facility] Initial Evaluation  Subjective:  The history is provided by the patient. No  was used.   Snow Hawthorne being seen for neck pain & headaches.   Problem began 10/25/2019 (date of orders). Where condition occurred: for unknown reasons.Problem occurred: unknown cause  and reported as 6/10 on pain scale. General health as reported by patient is good. Pertinent medical history includes:  Cancer, depression, migraines/headaches and overweight.  Medical allergies: adhesives and other. Other medical allergies details: see chart.  Surgeries include:  Cancer surgery.  Current medications:  Anti-depressants, muscle relaxants, sleep medication, steroids and other. Other medications details: Natural.   Primary job tasks include:  Driving, lifting/carrying and repetitive tasks.  Pain is described as aching, burning, sharp and stabbing and is constant. Pain is the same all the time. Since onset symptoms are gradually improving.  Previous treatment includes physical therapy, chiropractic and other. There was moderate improvement following previous treatment.   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    Barriers include:  None as reported by patient.  Red flags:  None as reported by patient.  Type of problem:  Cervical spine   Condition occurred with:  Insidious onset. This is a chronic condition   Problem details: Patient reports history of neck pain and headaches for many years with no precipitating event at onset. She reports that she had injections on Friday and has been feeling better since then. .   Patient reports pain:  Cervical left side and cervical right side (L>R). Radiates to:  Head, upper arm left, lower arm left and other (upper back). Associated symptoms:  Headache, dizziness and loss of motion/stiffness. Symptoms are exacerbated by certain positions, driving, lifting and other (cold weather) and relieved by  other (injections).                      Objective:  System    Physical Exam    [place] Cervical Evaluation    Posture:  Sitting: fair  Standing: good  Protruding Head: no  Wry Neck: no  Correction of Posture: no effect    Movement Loss:  Protrusion (PRO): nil and pain  Flexion (Flex): nil and pain  Retraction (RET): mod and pain  Extension (EXT): mod and pain  Lateral Flexion Right (LF R): min and pain  Lateral Flexion Left (LF L): min and pain  Rotation Right (ROT R): min and pain  Rotation Left (ROT L): mod and pain  Test Movements:      RET: During: increases  After: no worse    Repeat RET: During: increases  After: no worse  Mechanical Response: IncROM                                                                     ROS    Assessment/Plan:    Patient is a 59 year old female with cervical complaints.    Patient has the following significant findings with corresponding treatment plan.                Diagnosis 1:  Neck pain/headaches  Pain -  self management, education, directional preference exercise and home program  Decreased ROM/flexibility - manual therapy, therapeutic exercise and home program  Impaired muscle performance - neuro re-education and home program  Decreased function - therapeutic activities and home program  Impaired posture - neuro re-education and home program    Therapy Evaluation Codes:     Cumulative Therapy Evaluation is: Low complexity.    Previous and current functional limitations:  (See Goal Flow Sheet for this information)    Short term and Long term goals: (See Goal Flow Sheet for this information)     Communication ability:  Patient appears to be able to clearly communicate and understand verbal and written communication and follow directions correctly.  Treatment Explanation - The following has been discussed with the patient:   RX ordered/plan of care  Anticipated outcomes  Possible risks and side effects  This patient would benefit from PT intervention to resume normal  activities.   Rehab potential is good.    Frequency:  1 X week, once daily  Duration:  for 6 weeks  Discharge Plan:  Achieve all LTG.  Independent in home treatment program.  Reach maximal therapeutic benefit.    Please refer to the daily flowsheet for treatment today, total treatment time and time spent performing 1:1 timed codes.

## 2019-11-04 ENCOUNTER — THERAPY VISIT (OUTPATIENT)
Dept: PHYSICAL THERAPY | Facility: CLINIC | Age: 59
End: 2019-11-04
Payer: MEDICARE

## 2019-11-04 DIAGNOSIS — M54.2 NECK PAIN: ICD-10-CM

## 2019-11-04 PROCEDURE — 97112 NEUROMUSCULAR REEDUCATION: CPT | Mod: GP | Performed by: PHYSICAL THERAPIST

## 2019-11-04 PROCEDURE — 97140 MANUAL THERAPY 1/> REGIONS: CPT | Mod: GP | Performed by: PHYSICAL THERAPIST

## 2019-11-04 PROCEDURE — 97110 THERAPEUTIC EXERCISES: CPT | Mod: GP | Performed by: PHYSICAL THERAPIST

## 2019-11-11 ENCOUNTER — THERAPY VISIT (OUTPATIENT)
Dept: PHYSICAL THERAPY | Facility: CLINIC | Age: 59
End: 2019-11-11
Payer: MEDICARE

## 2019-11-11 DIAGNOSIS — M54.2 NECK PAIN: ICD-10-CM

## 2019-11-11 PROCEDURE — 97112 NEUROMUSCULAR REEDUCATION: CPT | Mod: GP | Performed by: PHYSICAL THERAPIST

## 2019-11-11 PROCEDURE — 97140 MANUAL THERAPY 1/> REGIONS: CPT | Mod: GP | Performed by: PHYSICAL THERAPIST

## 2019-11-11 PROCEDURE — 97110 THERAPEUTIC EXERCISES: CPT | Mod: GP | Performed by: PHYSICAL THERAPIST

## 2019-11-15 ENCOUNTER — DOCUMENTATION ONLY (OUTPATIENT)
Dept: NEUROLOGY | Facility: CLINIC | Age: 59
End: 2019-11-15

## 2019-11-15 NOTE — PROGRESS NOTES
Received form from [facility] it has been placed in providers folder for review and signature.

## 2019-11-18 ENCOUNTER — THERAPY VISIT (OUTPATIENT)
Dept: PHYSICAL THERAPY | Facility: CLINIC | Age: 59
End: 2019-11-18
Payer: MEDICARE

## 2019-11-18 DIAGNOSIS — M54.2 NECK PAIN: ICD-10-CM

## 2019-11-18 PROCEDURE — 97112 NEUROMUSCULAR REEDUCATION: CPT | Mod: GP | Performed by: PHYSICAL THERAPIST

## 2019-11-18 PROCEDURE — 97140 MANUAL THERAPY 1/> REGIONS: CPT | Mod: GP | Performed by: PHYSICAL THERAPIST

## 2019-11-18 PROCEDURE — 97110 THERAPEUTIC EXERCISES: CPT | Mod: GP | Performed by: PHYSICAL THERAPIST

## 2019-11-25 ENCOUNTER — THERAPY VISIT (OUTPATIENT)
Dept: PHYSICAL THERAPY | Facility: CLINIC | Age: 59
End: 2019-11-25
Payer: MEDICARE

## 2019-11-25 DIAGNOSIS — M54.2 NECK PAIN: ICD-10-CM

## 2019-11-25 PROCEDURE — 97112 NEUROMUSCULAR REEDUCATION: CPT | Mod: GP | Performed by: PHYSICAL THERAPIST

## 2019-11-25 PROCEDURE — 97110 THERAPEUTIC EXERCISES: CPT | Mod: GP | Performed by: PHYSICAL THERAPIST

## 2019-11-25 PROCEDURE — 97140 MANUAL THERAPY 1/> REGIONS: CPT | Mod: GP | Performed by: PHYSICAL THERAPIST

## 2019-12-02 ENCOUNTER — THERAPY VISIT (OUTPATIENT)
Dept: PHYSICAL THERAPY | Facility: CLINIC | Age: 59
End: 2019-12-02
Payer: MEDICARE

## 2019-12-02 DIAGNOSIS — M54.2 NECK PAIN: ICD-10-CM

## 2019-12-02 PROCEDURE — 97110 THERAPEUTIC EXERCISES: CPT | Mod: GP | Performed by: PHYSICAL THERAPIST

## 2019-12-02 PROCEDURE — 97140 MANUAL THERAPY 1/> REGIONS: CPT | Mod: GP | Performed by: PHYSICAL THERAPIST

## 2019-12-02 NOTE — PROGRESS NOTES
PROGRESS  REPORT    Progress reporting period is from 10/28/19 to 12/2/19.       SUBJECTIVE  Subjective changes noted by patient:  Patient reports she has had a few headaches the last week but these don't feel like they are coming from the neck. She reports the neck feels a little tight but overall doing well    Current Pain level: 2/10.      Initial Pain level: 6/10.   Changes in function:  Yes (See Goal flowsheet attached for changes in current functional level)  Adverse reaction to treatment or activity: None    OBJECTIVE  Changes noted in objective findings:  AROM cervical extension Min loss, bilat rotation WNL with end range tightness to the left. Patient demonstrates with improved sitting posture and improved recruitment of scapular stabilizers     ASSESSMENT/PLAN  Updated problem list and treatment plan: Diagnosis 1:  Neck pain / headaches  Pain -  self management, education, directional preference exercise and home program  Decreased ROM/flexibility - manual therapy, therapeutic exercise and home program  Impaired muscle performance - neuro re-education and home program  Decreased function - therapeutic activities and home program  STG/LTGs have been met or progress has been made towards goals:  Yes (See Goal flow sheet completed today.)  Assessment of Progress: The patient's condition is improving.  Self Management Plans:  Patient has been instructed in a home treatment program.  Patient  has been instructed in self management of symptoms.  I have re-evaluated this patient and find that the nature, scope, duration and intensity of the therapy is appropriate for the medical condition of the patient.  Snow continues to require the following intervention to meet STG and LTG's:  PT    Recommendations:  This patient would benefit from continued therapy.     Frequency:  1 X week, once daily  Duration:  for 3 weeks        Please refer to the daily flowsheet for treatment today, total treatment time and time spent  performing 1:1 timed codes.

## 2019-12-02 NOTE — LETTER
[facility] PHYSICAL THERAPY  [address] [address]  [phone]    2019    Re: Snow Hawthorne   :   1960  MRN:  [id number]   REFERRING PHYSICIAN:   Francesca Sandy    [facility] PHYSICAL THERAPY    Date of Initial Evaluation:  10/28/2019  Visits:  Rxs Used: 6  Reason for Referral:  Neck pain    EVALUATION SUMMARY    PROGRESS  REPORT    Progress reporting period is from 10/28/19 to 19.       SUBJECTIVE  Subjective changes noted by patient:  Patient reports she has had a few headaches the last week but these don't feel like they are coming from the neck. She reports the neck feels a little tight but overall doing well    Current Pain level: 2/10.      Initial Pain level: 6/10.   Changes in function:  Yes (See Goal flowsheet attached for changes in current functional level)  Adverse reaction to treatment or activity: None    OBJECTIVE  Changes noted in objective findings:  AROM cervical extension Min loss, bilat rotation WNL with end range tightness to the left. Patient demonstrates with improved sitting posture and improved recruitment of scapular stabilizers     ASSESSMENT/PLAN  Updated problem list and treatment plan: Diagnosis 1:  Neck pain / headaches  Pain -  self management, education, directional preference exercise and home program  Decreased ROM/flexibility - manual therapy, therapeutic exercise and home program  Impaired muscle performance - neuro re-education and home program  Decreased function - therapeutic activities and home program  STG/LTGs have been met or progress has been made towards goals:  Yes (See Goal flow sheet completed today.)  Assessment of Progress: The patient's condition is improving.  Self Management Plans:  Patient has been instructed in a home treatment program.  Patient  has been instructed in self management of symptoms.  I have re-evaluated this patient and find that the nature, scope, duration and  intensity of the therapy is appropriate for the medical condition of the patient.  Snow continues to require the following intervention to meet STG and LTG's:  PT    Recommendations:  This patient would benefit from continued therapy.     Frequency:  1 X week, once daily  Duration:  for 3 weeks              Thank you for your referral.    INQUIRIES  Therapist: JADYN Galan [facility] PHYSICAL THERAPY  [address] [place] [id number]  Phone: [phone]  Fax: [phone]

## 2020-02-06 ENCOUNTER — TELEPHONE (OUTPATIENT)
Dept: PHYSICAL MEDICINE AND REHAB | Facility: CLINIC | Age: 60
End: 2020-02-06

## 2020-02-06 NOTE — TELEPHONE ENCOUNTER
I called Snow Hawthorne to coordinate scheduling for trigger point injections with Dr. Lakisha Luque.  I was unable to reach her and left voice-mail on her phone with the reason for my call and my contact information.  I requested a call back to coordinate scheduling this appointment and to discuss any concerns or questions she may have regarding this referral.    Currently awaiting call back.

## 2020-02-21 ENCOUNTER — OFFICE VISIT (OUTPATIENT)
Dept: PHYSICAL MEDICINE AND REHAB | Facility: CLINIC | Age: 60
End: 2020-02-21
Payer: MEDICARE

## 2020-02-21 ENCOUNTER — OFFICE VISIT (OUTPATIENT)
Dept: NEUROLOGY | Facility: CLINIC | Age: 60
End: 2020-02-21
Payer: MEDICARE

## 2020-02-21 VITALS
DIASTOLIC BLOOD PRESSURE: 58 MMHG | RESPIRATION RATE: 16 BRPM | HEART RATE: 95 BPM | OXYGEN SATURATION: 99 % | SYSTOLIC BLOOD PRESSURE: 105 MMHG

## 2020-02-21 VITALS
DIASTOLIC BLOOD PRESSURE: 58 MMHG | HEART RATE: 95 BPM | RESPIRATION RATE: 16 BRPM | OXYGEN SATURATION: 99 % | SYSTOLIC BLOOD PRESSURE: 105 MMHG

## 2020-02-21 DIAGNOSIS — M79.18 CHRONIC MYOFASCIAL PAIN: Primary | ICD-10-CM

## 2020-02-21 DIAGNOSIS — G89.29 CHRONIC MYOFASCIAL PAIN: Primary | ICD-10-CM

## 2020-02-21 DIAGNOSIS — M79.18 MYOFASCIAL PAIN: Primary | ICD-10-CM

## 2020-02-21 RX ORDER — TRIAMCINOLONE ACETONIDE 40 MG/ML
40 INJECTION, SUSPENSION INTRA-ARTICULAR; INTRAMUSCULAR ONCE
Status: COMPLETED | OUTPATIENT
Start: 2020-02-21 | End: 2020-02-21

## 2020-02-21 RX ORDER — BUPIVACAINE HYDROCHLORIDE 5 MG/ML
5 INJECTION, SOLUTION EPIDURAL; INTRACAUDAL ONCE
Status: COMPLETED | OUTPATIENT
Start: 2020-02-21 | End: 2020-02-21

## 2020-02-21 RX ORDER — MULTIVIT-MIN/IRON/FOLIC ACID/K 18-600-40
2 CAPSULE ORAL DAILY
COMMUNITY

## 2020-02-21 RX ADMIN — BUPIVACAINE HYDROCHLORIDE 25 MG: 5 INJECTION, SOLUTION EPIDURAL; INTRACAUDAL at 13:47

## 2020-02-21 RX ADMIN — TRIAMCINOLONE ACETONIDE 40 MG: 40 INJECTION, SUSPENSION INTRA-ARTICULAR; INTRAMUSCULAR at 13:48

## 2020-02-21 NOTE — LETTER
2020       RE: Snow Hawthorne  [address] [place] [id number]     Dear Colleague,    Thank you for referring your patient, Snow Hawthorne, to the [facility] PHYSICAL MEDICINE AND REHABILITATION at [facility]. Please see a copy of my visit note below.    [facility]  Clinics & Surgery Center  Physical Medicine & Rehabilitation Consultation    Snow Hawthorne   YOB: 1960  MRN: [id number]   Date of Service: 10/25/19    History of Present Illness:  Snow Hawthorne is a 59 year old female with a history of chronic migraine headaches who is referred to clinic for repeat trigger point injections for management of myofascial pain in her neck and shoulder muscles. Patient feels that her migraines stem from excessive muscle tension/tightness in her neck and shoulder muscles. Her left shoulder muscles are always more tight and painful than her right side.     The patient most recently underwent trigger point injections on 10/25/2019, and this provided excellent relief of her muscle pain for months. Unfortunately, patient developed shingles, and this caused her pain to return. She has been treated for shingles, and does not have any active lesions today.     Physical Examination:  VS: /58   Pulse 95   Resp 16   LMP 10/12/2003   SpO2 99%      Pleasant and cooperative, in no acute distress  No lesions or abrasions on exposed skin  Myofascial trigger points noted in bilateral upper trapezius muscles, L>R    Procedure:     Drug:   Bupivacaine 0.5%: 5 ml (Lot: [id number], Exp: 2023, NDC: 15863-844-95)  Kenalo mg = 1 ml (Lot: [id number], Exp: 10/2021, NDC: [address]-6)    Prior to the start of the procedure and with procedural staff participation, I verbally confirmed the patient s identity using two indicators, relevant allergies, that the procedure was appropriate and matched the consent or  emergent situation, and that the correct equipment/implants were available. Immediately prior to starting the procedure I conducted the Time Out with the procedural staff and re-confirmed the patient s name, procedure, and site/side. (The Joint Commission universal protocol was followed.)  Yes    Sedation (Moderate or Deep): None    Trigger Point Injections    Areas were prepped with ChloraPrep.  Using standard precautions a 25 mm EMG needle with EMG guidance was used to inject a mixture of 0.5% bupivacaine and kenalog into the left trapezius muscles for a total of 12 site/s, and the right trapezius muscles for a total of 4 site/s.  Patient tolerated the procedure well and there were no complications.      Snow Hawthorne tolerated the procedure well without any immediate complications. She was allowed to recover for an appropriate period of time and was discharged home in stable condition.  Patient will follow-up regarding response to this procedure.    Assessment & Recommendations:  Snow Hawthorne is a 59 year old female who presents to rehabilitation clinic for trigger point injections for management of myofascial pain in her neck and shoulder girdle musculature.  Snow Hawthorne tolerated the procedure well and noted reduction in baseline pain following the procedure. We did discuss at today's visit that if the patient experiences resolution or improvement in pain that lasts > 1 month, we may repeat these injections no sooner than 3 months from today.     Again, thank you for allowing me to participate in the care of your patient.      Sincerely,    Lakisha Luque MD

## 2020-02-21 NOTE — PROGRESS NOTES
[facility]  Physical Medicine & Rehabilitation Consultation    Snow Hawthorne   YOB: 1960  MRN: [id number]   Date of Service: 10/25/19      History of Present Illness:  Snow Hawthorne is a 59 year old female with a history of chronic migraine headaches who is referred to clinic for repeat trigger point injections for management of myofascial pain in her neck and shoulder muscles. Patient feels that her migraines stem from excessive muscle tension/tightness in her neck and shoulder muscles. Her left shoulder muscles are always more tight and painful than her right side.     The patient most recently underwent trigger point injections on 10/25/2019, and this provided excellent relief of her muscle pain for months. Unfortunately, patient developed shingles, and this caused her pain to return. She has been treated for shingles, and does not have any active lesions today.       Physical Examination:  VS: /58   Pulse 95   Resp 16   LMP 10/12/2003   SpO2 99%      Pleasant and cooperative, in no acute distress  No lesions or abrasions on exposed skin  Myofascial trigger points noted in bilateral upper trapezius muscles, L>R      Procedure:     Drug:   Bupivacaine 0.5%: 5 ml (Lot: [id number], Exp: 2023, NDC: 62332-390-22)  Kenalo mg = 1 ml (Lot: [id number], Exp: 10/2021, NDC: [address]-1)    Prior to the start of the procedure and with procedural staff participation, I verbally confirmed the patient s identity using two indicators, relevant allergies, that the procedure was appropriate and matched the consent or emergent situation, and that the correct equipment/implants were available. Immediately prior to starting the procedure I conducted the Time Out with the procedural staff and re-confirmed the patient s name, procedure, and site/side. (The Joint Commission universal protocol was followed.)  Yes    Sedation (Moderate or  Deep): None      Trigger Point Injections    Areas were prepped with ChloraPrep.  Using standard precautions a 25 mm EMG needle with EMG guidance was used to inject a mixture of 0.5% bupivacaine and kenalog into the left trapezius muscles for a total of 12 site/s, and the right trapezius muscles for a total of 4 site/s.  Patient tolerated the procedure well and there were no complications.      Snow Hawthorne tolerated the procedure well without any immediate complications. She was allowed to recover for an appropriate period of time and was discharged home in stable condition.  Patient will follow-up regarding response to this procedure.      Assessment & Recommendations:  Snow Hawthorne is a 59 year old female who presents to rehabilitation clinic for trigger point injections for management of myofascial pain in her neck and shoulder girdle musculature.  Snow Hawthorne tolerated the procedure well and noted reduction in baseline pain following the procedure. We did discuss at today's visit that if the patient experiences resolution or improvement in pain that lasts > 1 month, we may repeat these injections no sooner than 3 months from today.

## 2020-02-21 NOTE — PATIENT INSTRUCTIONS
Plan:  Wait and see and call if no effect after trigger points injections  Call our Clinic if headaches persistent and would like to consider preventive treatment.   Follow up as needed

## 2020-02-21 NOTE — LETTER
2/21/2020       RE: Snow Hawthorne  [address] [place] [id number]     Dear Colleague,    Thank you for referring your patient, Snow Hawthorne, to the [facility] NEUROLOGY at [facility]. Please see a copy of my visit note below.    Re: Snow Hawthorne      MRN# [id number]  YOB: 1960  Date of Visit:2/21/2020     OUTPATIENT NEUROLOGY VISIT NOTE    Reason for Visit:  Headache follow up    Interval History:  Snow Hawthorne is a 59-year-old female presents to the clinic today for headache follow up.  History of breast malignancy and s/p right lumpectomy in 2007 and has been seen at [facility] and cancer free for 12 years, s/p chemotherapy and radiation, depression controlled and seeing a psychiatrist and therapist and doing well.    Accompanied by her   Patient reports that she had trigger points on 10/25/19 and headaches were better until shingles. Shingles reported in January of 2019 and right eyelid, scalp and a few in the forehead and was treated with antiviral meds. Has been followed by ophthalmology.    Patient reports almost daily headaches for 3 weeks and sometimes uses sumatriptan injections. Patient reports that she is hoping that trigger points will help this time. Patient declines headache preventive treatment at this time.   Patient reports that she is not comfortable with either CGRPs or Botox  Patient reports that she used sumatriptan inj about 6 times since 1.5 months and reports that its good.       Treatments Tried:    Excedrin Migraine helps but limits to no more than 3 days per week  Sumatriptan 6 mg subcutaneous and helps most of the time but tries not to do the injections more than twice per week. Patient has never tried sumatriptan pills. Never tried naproxen for migraine   Dysport tried 3-4 times at [facility] -worsening headaches and feeling sick for a week after and neck soreness  "and she would be out a whole week with migraines and end out losing her job  Migralief-did not help  Acupuncture, chiropractor, massages  Topiramate 300 mg at bedtime and no releif  Gabapentin 2400 mg daily and did not help  Effexor 450 mg and did not help  Trazadone 100 mg HS and helps with sleep  Compazine -no side effects   Propranolol -did not help  Verapamil-did not help  Possibly tried depakote but does not remember for sure  Cymbalta -\"did not handle it well\"      Headache treatment Plan:  Wait and see and call if no effect after trigger points injections  Call our Clinic if headaches persistent and would like to consider preventive treatment.   Follow up as needed     Past Medical History reviewed   Social History     Tobacco Use     Smoking status: Former Smoker     Packs/day: 1.00     Years: 20.00     Pack years: 20.00     Smokeless tobacco: Never Used     Tobacco comment: quit 2 mo ago   Substance Use Topics     Alcohol use: Not Currently    reviewed and verified with the patient     Allergies   Allergen Reactions     Atenolol Anaphylaxis and Other (See Comments)     Hypotension       Diatrizoate Anaphylaxis     Other reaction(s): *Unknown  IV Contrast Dye       Nitrofurantoin Anaphylaxis     Macrobid       Beta Adrenergic Blockers      SYNCOPY     Ciprofloxacin Other (See Comments)     \"becomes irritable\"  \"becomes irritable\"       Clotrimazole Itching     Contrast Dye      IVP     Metronidazole Other (See Comments)     Itching inside     Penicillins      Silver Nitrate Other (See Comments)     Other reaction(s): Irritation At Inj Site  Causes skin infection       Sulfa Drugs      Adhesive Tape Itching and Rash       Current Outpatient Medications   Medication Sig Dispense Refill     benzonatate (TESSALON) 100 MG capsule TAKE 1 CAPSULE BY MOUTH THREE TIMES A DAY  0     cetirizine HCl 10 MG CAPS Takes one capsule daily.       COMPAZINE 10 MG OR TABS 1 TABLET 3 TIMES DAILY AS NEEDED (Patient not taking: " Reported on 10/22/2019) 10 0     cyanocobalamin (CYANOCOBALAMIN) 1000 MCG/ML injection Inject 1,000 mcg into the muscle       diphenhydrAMINE (BENADRYL) 50 MG capsule Take 1 tablet (50 mg) 1 hour prior to your CT scan.       EFFEXOR 75 MG OR TABS  5 at hs (Patient taking differently: 225 mg ) 60 0     EPINEPHrine (EPIPEN/ADRENACLICK/OR ANY BX GENERIC EQUIV) 0.3 MG/0.3ML injection 2-pack Inject 0.3 mg into the muscle as needed       fluticasone (VERAMYST) 27.5 MCG/SPRAY nasal spray Spray 2 sprays in nostril       IMITREX STATDOSE 6 MG/0.5ML SC KIT 1 at onset, repeat in 1 hr prn  0     KETOROLAC 10 MG OR TABS 1 TABLET EVERY 4 TO 6 HOURS AS NEEDED 20 0     latanoprost (XALATAN) 0.005 % ophthalmic solution INSTILL ONE DROP IN EACH EYE AT BEDTIME       montelukast (SINGULAIR) 10 MG tablet Take 10 mg by mouth       Riboflavin-Magnesium-Feverfew (MIGRELIEF) 200-180-50 MG TABS        SUMAtriptan (IMITREX) 50 MG tablet Take 1-2 tablets ( mg) by mouth at onset of headache for migraine (may repeat in 2 hours as needed. Max 200 mg in 24 hours) 12 tablet 6     tiZANidine (ZANAFLEX) 4 MG tablet PLEASE SEE ATTACHED FOR DETAILED DIRECTIONS  3     Vitamin D3 25 mcg (1000 units) PO tablet Take by mouth daily     reviewed and verified with the patient    Review of Systems:   A 10-point ROS including constitutional, eyes, respiratory, cardiovascular, gastroenterology, genitourinary, integumentary, musculoskeletal, neurology and psychiatric were all negative except as mentioned in the interval history.      General Exam:   /58   Pulse 95   Resp 16   LMP 10/12/2003   SpO2 99%   GEN: Awake, NAD; good eye contact, responses appropriately, healthy appearing. Speech is fluent without dysarthria. EOM intact. There is no nystagmus. Has conjugated gaze. Face is symmetrical. Intact and symmetrical eyebrow and lid raise and eyelid closure, smiles. Hearing Intact to conversation speech. Strength intact in the upper and lower  extremities bilaterally. Normal casual gait.    Assessment and Plan:  See Interval History for our discussion and plan    I discussed all my recommendation with Snow Hawthorne. The patient verbalizes understanding and comfortable with the plan. The patient has our clinic phone number to call with any questions or concerns. All of the patient's questions were answered from the best of my current knowledge.    Time spent with pt answering questions, discussing findings, counseling and coordinating care was more than 50% the appointment time, 15  minutes.     DELORIS Painter, CNP  [facility]

## 2020-02-21 NOTE — PROGRESS NOTES
Re: Snow Hawthorne      MRN# [id number]  YOB: 1960  Date of Visit:2/21/2020     OUTPATIENT NEUROLOGY VISIT NOTE    Reason for Visit:  Headache follow up    Interval History:  Snow Hawthorne is a 59-year-old female presents to the clinic today for headache follow up.  History of breast malignancy and s/p right lumpectomy in 2007 and has been seen at [facility] and cancer free for 12 years, s/p chemotherapy and radiation, depression controlled and seeing a psychiatrist and therapist and doing well.    Accompanied by her   Patient reports that she had trigger points on 10/25/19 and headaches were better until shingles. Shingles reported in January of 2019 and right eyelid, scalp and a few in the forehead and was treated with antiviral meds. Has been followed by ophthalmology.    Patient reports almost daily headaches for 3 weeks and sometimes uses sumatriptan injections. Patient reports that she is hoping that trigger points will help this time. Patient declines headache preventive treatment at this time.   Patient reports that she is not comfortable with either CGRPs or Botox  Patient reports that she used sumatriptan inj about 6 times since 1.5 months and reports that its good.       Treatments Tried:    Excedrin Migraine helps but limits to no more than 3 days per week  Sumatriptan 6 mg subcutaneous and helps most of the time but tries not to do the injections more than twice per week. Patient has never tried sumatriptan pills. Never tried naproxen for migraine   Dysport tried 3-4 times at [facility] -worsening headaches and feeling sick for a week after and neck soreness and she would be out a whole week with migraines and end out losing her job  Migralief-did not help  Acupuncture, chiropractor, massages  Topiramate 300 mg at bedtime and no releif  Gabapentin 2400 mg daily and did not help  Effexor 450 mg and did not help  Trazadone 100 mg HS and helps with  "sleep  Compazine -no side effects   Propranolol -did not help  Verapamil-did not help  Possibly tried depakote but does not remember for sure  Cymbalta -\"did not handle it well\"      Headache treatment Plan:  Wait and see and call if no effect after trigger points injections  Call our Clinic if headaches persistent and would like to consider preventive treatment.   Follow up as needed     Past Medical History reviewed   Social History     Tobacco Use     Smoking status: Former Smoker     Packs/day: 1.00     Years: 20.00     Pack years: 20.00     Smokeless tobacco: Never Used     Tobacco comment: quit 2 mo ago   Substance Use Topics     Alcohol use: Not Currently    reviewed and verified with the patient     Allergies   Allergen Reactions     Atenolol Anaphylaxis and Other (See Comments)     Hypotension       Diatrizoate Anaphylaxis     Other reaction(s): *Unknown  IV Contrast Dye       Nitrofurantoin Anaphylaxis     Macrobid       Beta Adrenergic Blockers      SYNCOPY     Ciprofloxacin Other (See Comments)     \"becomes irritable\"  \"becomes irritable\"       Clotrimazole Itching     Contrast Dye      IVP     Metronidazole Other (See Comments)     Itching inside     Penicillins      Silver Nitrate Other (See Comments)     Other reaction(s): Irritation At Inj Site  Causes skin infection       Sulfa Drugs      Adhesive Tape Itching and Rash       Current Outpatient Medications   Medication Sig Dispense Refill     benzonatate (TESSALON) 100 MG capsule TAKE 1 CAPSULE BY MOUTH THREE TIMES A DAY  0     cetirizine HCl 10 MG CAPS Takes one capsule daily.       COMPAZINE 10 MG OR TABS 1 TABLET 3 TIMES DAILY AS NEEDED (Patient not taking: Reported on 10/22/2019) 10 0     cyanocobalamin (CYANOCOBALAMIN) 1000 MCG/ML injection Inject 1,000 mcg into the muscle       diphenhydrAMINE (BENADRYL) 50 MG capsule Take 1 tablet (50 mg) 1 hour prior to your CT scan.       EFFEXOR 75 MG OR TABS  5 at hs (Patient taking differently: 225 mg ) " 60 0     EPINEPHrine (EPIPEN/ADRENACLICK/OR ANY BX GENERIC EQUIV) 0.3 MG/0.3ML injection 2-pack Inject 0.3 mg into the muscle as needed       fluticasone (VERAMYST) 27.5 MCG/SPRAY nasal spray Spray 2 sprays in nostril       IMITREX STATDOSE 6 MG/0.5ML SC KIT 1 at onset, repeat in 1 hr prn  0     KETOROLAC 10 MG OR TABS 1 TABLET EVERY 4 TO 6 HOURS AS NEEDED 20 0     latanoprost (XALATAN) 0.005 % ophthalmic solution INSTILL ONE DROP IN EACH EYE AT BEDTIME       montelukast (SINGULAIR) 10 MG tablet Take 10 mg by mouth       Riboflavin-Magnesium-Feverfew (MIGRELIEF) 200-180-50 MG TABS        SUMAtriptan (IMITREX) 50 MG tablet Take 1-2 tablets ( mg) by mouth at onset of headache for migraine (may repeat in 2 hours as needed. Max 200 mg in 24 hours) 12 tablet 6     tiZANidine (ZANAFLEX) 4 MG tablet PLEASE SEE ATTACHED FOR DETAILED DIRECTIONS  3     Vitamin D3 25 mcg (1000 units) PO tablet Take by mouth daily     reviewed and verified with the patient    Review of Systems:   A 10-point ROS including constitutional, eyes, respiratory, cardiovascular, gastroenterology, genitourinary, integumentary, musculoskeletal, neurology and psychiatric were all negative except as mentioned in the interval history.      General Exam:   /58   Pulse 95   Resp 16   LMP 10/12/2003   SpO2 99%   GEN: Awake, NAD; good eye contact, responses appropriately, healthy appearing. Speech is fluent without dysarthria. EOM intact. There is no nystagmus. Has conjugated gaze. Face is symmetrical. Intact and symmetrical eyebrow and lid raise and eyelid closure, smiles. Hearing Intact to conversation speech. Strength intact in the upper and lower extremities bilaterally. Normal casual gait.    Assessment and Plan:  See Interval History for our discussion and plan    I discussed all my recommendation with Snow Hawthorne. The patient verbalizes understanding and comfortable with the plan. The patient has our clinic phone number to call  with any questions or concerns. All of the patient's questions were answered from the best of my current knowledge.      Time spent with pt answering questions, discussing findings, counseling and coordinating care was more than 50% the appointment time, 15  minutes.         DELORIS Painter, [facility]

## 2020-02-21 NOTE — NURSING NOTE
Chief Complaint   Patient presents with     Headache     ump return headache 3 month f/u        Anders King, EMT

## 2020-03-06 PROBLEM — M54.2 NECK PAIN: Status: RESOLVED | Noted: 2019-10-28 | Resolved: 2020-03-06

## 2020-03-15 ENCOUNTER — HEALTH MAINTENANCE LETTER (OUTPATIENT)
Age: 60
End: 2020-03-15

## 2020-09-25 ENCOUNTER — OFFICE VISIT (OUTPATIENT)
Dept: PHYSICAL MEDICINE AND REHAB | Facility: CLINIC | Age: 60
End: 2020-09-25
Payer: MEDICARE

## 2020-09-25 DIAGNOSIS — M79.18 MYOFASCIAL PAIN: Primary | ICD-10-CM

## 2020-09-25 RX ORDER — TRIAMCINOLONE ACETONIDE 40 MG/ML
40 INJECTION, SUSPENSION INTRA-ARTICULAR; INTRAMUSCULAR ONCE
Status: COMPLETED | OUTPATIENT
Start: 2020-09-25 | End: 2020-09-25

## 2020-09-25 RX ORDER — BUPIVACAINE HYDROCHLORIDE 2.5 MG/ML
5 INJECTION, SOLUTION EPIDURAL; INFILTRATION; INTRACAUDAL ONCE
Status: COMPLETED | OUTPATIENT
Start: 2020-09-25 | End: 2020-09-25

## 2020-09-25 RX ADMIN — TRIAMCINOLONE ACETONIDE 40 MG: 40 INJECTION, SUSPENSION INTRA-ARTICULAR; INTRAMUSCULAR at 11:36

## 2020-09-25 RX ADMIN — BUPIVACAINE HYDROCHLORIDE 12.5 MG: 2.5 INJECTION, SOLUTION EPIDURAL; INFILTRATION; INTRACAUDAL at 11:36

## 2020-09-25 NOTE — LETTER
2020     RE: Snow Hawthorne  [address] [place] [id number]     Dear Colleague,    Thank you for referring your patient, Snow Hawthorne, to the [facility] PHYSICAL MEDICINE AND REHABILITATION at [facility]. Please see a copy of my visit note below.    [facility]  Clinics & Surgery Center  Physical Medicine & Rehabilitation Consultation    Snow Hawthorne   YOB: 1960  MRN: [id number]   Date of Service: 10/25/19      History of Present Illness:  Snow Hawthorne is a 59 year old female with a history of chronic migraine headaches who is referred to clinic for repeat trigger point injections for management of myofascial pain in her neck and shoulder muscles. Patient feels that her migraines stem from excessive muscle tension/tightness in her neck and shoulder muscles. At the last injection, her left shoulder muscles are always more tight and painful than her right side. Today her pain is more mid-line.    The patient most recently underwent trigger point injections on 2020, and this provided excellent relief of her muscle pain for months. Recently had cataract surgery. Both eyes done. Also lymphedema of her right breast area, which she is still doing rehab for.      Physical Examination:    Pleasant and cooperative, in no acute distress  No lesions or abrasions on exposed skin  Myofascial trigger points noted in bilateral upper trapezius muscles, L>R      Procedure:     Drug:   Bupivacaine 0.25%: 5 ml (Lot: [id number], Exp: 2023, NDC: 67595-765-92)  Kenalo mg = 1 ml (Lot: [id number], Exp: 2022, NDC: [address]-3)    Prior to the start of the procedure and with procedural staff participation, I verbally confirmed the patient s identity using two indicators, relevant allergies, that the procedure was appropriate and matched the consent or emergent situation, and that the correct  equipment/implants were available. Immediately prior to starting the procedure I conducted the Time Out with the procedural staff and re-confirmed the patient s name, procedure, and site/side. (The Joint Commission universal protocol was followed.)  Yes    Sedation (Moderate or Deep): None      Trigger Point Injections    Areas were prepped with ChloraPrep.  Using standard precautions a 25 mm EMG needle with EMG guidance was used to inject a mixture of 0.25% bupivacaine and kenalog into the left trapezius muscles for a total of 12 site/s, left splenius for a total of 2 site/s, left levator scapula for a total of 2 site/s and the right trapezius muscles for a total of 10 site/s.  Patient tolerated the procedure well and there were no complications.      Snow Hawthorne tolerated the procedure well without any immediate complications. She was allowed to recover for an appropriate period of time and was discharged home in stable condition.  Patient will follow-up regarding response to this procedure.    Assessment & Recommendations:  Snow Hawthorne is a 59 year old female who presents to rehabilitation clinic for trigger point injections for management of myofascial pain in her neck and shoulder girdle musculature.  Snow Hawthorne tolerated the procedure well and noted reduction in baseline pain following the procedure. We did discuss at today's visit that if the patient experiences resolution or improvement in pain that lasts > 1 month, we may repeat these injections no sooner than 3 months from today.       Lakisha Luque MD

## 2020-09-25 NOTE — PROGRESS NOTES
[facility]  Physical Medicine & Rehabilitation Consultation    Snow Hawthorne   YOB: 1960  MRN: [id number]   Date of Service: 10/25/19      History of Present Illness:  Snow Hawthorne is a 59 year old female with a history of chronic migraine headaches who is referred to clinic for repeat trigger point injections for management of myofascial pain in her neck and shoulder muscles. Patient feels that her migraines stem from excessive muscle tension/tightness in her neck and shoulder muscles. At the last injection, her left shoulder muscles are always more tight and painful than her right side. Today her pain is more mid-line.    The patient most recently underwent trigger point injections on 2020, and this provided excellent relief of her muscle pain for months. Recently had cataract surgery. Both eyes done. Also lymphedema of her right breast area, which she is still doing rehab for.      Physical Examination:    Pleasant and cooperative, in no acute distress  No lesions or abrasions on exposed skin  Myofascial trigger points noted in bilateral upper trapezius muscles, L>R      Procedure:     Drug:   Bupivacaine 0.25%: 5 ml (Lot: [id number], Exp: 2023, NDC: 62962-226-48)  Kenalo mg = 1 ml (Lot: [id number], Exp: 2022, NDC: [address]-0)    Prior to the start of the procedure and with procedural staff participation, I verbally confirmed the patient s identity using two indicators, relevant allergies, that the procedure was appropriate and matched the consent or emergent situation, and that the correct equipment/implants were available. Immediately prior to starting the procedure I conducted the Time Out with the procedural staff and re-confirmed the patient s name, procedure, and site/side. (The Joint Commission universal protocol was followed.)  Yes    Sedation (Moderate or Deep): None      Trigger Point  Injections    Areas were prepped with ChloraPrep.  Using standard precautions a 25 mm EMG needle with EMG guidance was used to inject a mixture of 0.25% bupivacaine and kenalog into the left trapezius muscles for a total of 12 site/s, left splenius for a total of 2 site/s, left levator scapula for a total of 2 site/s and the right trapezius muscles for a total of 10 site/s.  Patient tolerated the procedure well and there were no complications.      Snow Hawthorne tolerated the procedure well without any immediate complications. She was allowed to recover for an appropriate period of time and was discharged home in stable condition.  Patient will follow-up regarding response to this procedure.      Assessment & Recommendations:  Snow Hawthorne is a 59 year old female who presents to rehabilitation clinic for trigger point injections for management of myofascial pain in her neck and shoulder girdle musculature.  Snow Hawthorne tolerated the procedure well and noted reduction in baseline pain following the procedure. We did discuss at today's visit that if the patient experiences resolution or improvement in pain that lasts > 1 month, we may repeat these injections no sooner than 3 months from today.       Lakisha Luque MD

## 2020-11-03 ENCOUNTER — MEDICAL CORRESPONDENCE (OUTPATIENT)
Dept: HEALTH INFORMATION MANAGEMENT | Facility: CLINIC | Age: 60
End: 2020-11-03

## 2020-11-10 NOTE — TELEPHONE ENCOUNTER
Rx Authorization:    Requested Medication/ Dose SUMAtriptan (IMITREX) 50 MG table    Date last refill ordered: 10/22/19    Quantity ordered: 12    # refills: 6    Date of last clinic visit with ordering provider: 2/21/20    Date of next clinic visit with ordering provider:     All pertinent protocol data (lab date/result):     Include pertinent information from patients message:

## 2020-11-11 RX ORDER — SUMATRIPTAN 50 MG/1
TABLET, FILM COATED ORAL
Qty: 12 TABLET | Refills: 6 | Status: SHIPPED | OUTPATIENT
Start: 2020-11-11 | End: 2021-11-10

## 2021-01-09 ENCOUNTER — HEALTH MAINTENANCE LETTER (OUTPATIENT)
Age: 61
End: 2021-01-09

## 2021-05-08 ENCOUNTER — HEALTH MAINTENANCE LETTER (OUTPATIENT)
Age: 61
End: 2021-05-08

## 2021-05-29 ENCOUNTER — RECORDS - HEALTHEAST (OUTPATIENT)
Dept: ADMINISTRATIVE | Facility: CLINIC | Age: 61
End: 2021-05-29

## 2021-05-30 ENCOUNTER — RECORDS - HEALTHEAST (OUTPATIENT)
Dept: ADMINISTRATIVE | Facility: CLINIC | Age: 61
End: 2021-05-30

## 2021-05-30 VITALS — BODY MASS INDEX: 39.31 KG/M2 | HEIGHT: 61 IN | WEIGHT: 208.2 LBS

## 2021-05-30 VITALS — WEIGHT: 217 LBS | HEIGHT: 61 IN | BODY MASS INDEX: 40.97 KG/M2

## 2021-05-31 ENCOUNTER — RECORDS - HEALTHEAST (OUTPATIENT)
Dept: ADMINISTRATIVE | Facility: CLINIC | Age: 61
End: 2021-05-31

## 2021-05-31 VITALS — HEIGHT: 61 IN | WEIGHT: 199 LBS | BODY MASS INDEX: 37.57 KG/M2

## 2021-05-31 VITALS — BODY MASS INDEX: 37.38 KG/M2 | WEIGHT: 198 LBS | HEIGHT: 61 IN

## 2021-05-31 VITALS — HEIGHT: 61 IN | WEIGHT: 206.3 LBS | BODY MASS INDEX: 38.95 KG/M2

## 2021-05-31 VITALS — HEIGHT: 61 IN | WEIGHT: 209.6 LBS | BODY MASS INDEX: 39.57 KG/M2

## 2021-06-01 ENCOUNTER — RECORDS - HEALTHEAST (OUTPATIENT)
Dept: ADMINISTRATIVE | Facility: CLINIC | Age: 61
End: 2021-06-01

## 2021-06-01 VITALS — BODY MASS INDEX: 40.02 KG/M2 | WEIGHT: 212 LBS | HEIGHT: 61 IN

## 2021-06-02 ENCOUNTER — RECORDS - HEALTHEAST (OUTPATIENT)
Dept: ADMINISTRATIVE | Facility: CLINIC | Age: 61
End: 2021-06-02

## 2021-06-10 NOTE — PROGRESS NOTES
Non-surgical Weight Loss Initial Diet Evaluation     Assessment:  Pt is a 56 y.o. female being seen today for non-surgical RD nutritional evaluation. Today we reviewed current eating habits and level of physical activity, and instructed on the changes that are required for successful weight loss outcomes.    Personal Goals: Needs help with portion sizes and recipes. Pt doesn't like how she looks and reports its starting to affect her body. Pt wants to be able to go hiking and fishing. **Pt has IBS and reports having diarrhea.   Personal goal weight: 145 lb    Phentermine: Pt prescribed naltrexone, however hasn't started taking it yet. Pt prefers not to take medication.     Pt's Initial Weight: 217 lbs  Weight: 208 lb 3.2 oz (94.4 kg)  Weight loss from initial: 8.8  % Weight loss: 4.06 %  BMI: Body mass index is 39.34 kg/(m^2).  IBW: 105 lbs    Estimated RMR ([place]-St Jeor equation): 1480 calories  Protein requirements (.5grams to .9grams per pound IBW, 20-30% of calories, minimum of 60-80gm per day):  52-94 grams     Food allergies, intolerances, Caodaism customs: Coconut, pineapple.       Vitamins/Mineral Supplementation: Vitamin D, B12 monthly injections.     Biggest struggle with weight loss: boredom and stress eat, pt not knowing which foods are healthy and not. Pt reports being a sweets eater.     Who does the grocery shopping for your household? Pt and spouse  Who prepares your meals at home? Pt and spouse    Diet Recall/Time: Pt wakes up 4-5am  Breakfast: protein shake (premier)   Am Snack: 1/2 sandwich with lunch meat, dilcia chips, yogurt.   Lunch: 1/2 sandwich, dilcia chips, yogurt.   Pm snack: apple or chips   Dinner: two battered fish fillets, tater tots/fries, vegetables.   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   Ice Cream/FrozenYogurt/Bakery: none       Recommended limiting eating out to no more than 2x/week.  Patient and I reviewed the importance of eating three consistent meals per day; as well as meal timing to be spaced 4-5 hours apart.  Snack choices: 100-150 calories (1-2x/day if physically hungry), incorporating a fruit/vegetable w/ protein source.    Portion Sizes problematic? yes per patient/diet recall  Encouraged slowing meal times down, 20-30 minutes, chewing to applesauce consistency.   To aid in proper portion control and slow meal time down discussed consuming meals off smaller plates, use toddler/children utensils and set utensils down after each bite.    Protein, vegetables/fruits, carbohydrates:   Reviewed lean protein sources today. Recommended consuming 20gm protein at 3 meals daily.  The patient and I discussed the importance of including lean/low fat protein at each meal and limiting carbohydrate intake to less than 25% of plate volume.     Beverages (Type/Oz. per day)  Water: 40 oz  Coffee: 2 cups  Tea: none  Milk: w/ cereal  Regular soda: none   Diet soda: none   Juice: none   Ankit-Aid/lemonade/etc: none   Alcohol: none     Discussed the importance of adequate hydration and the goal of 64+ oz of fluid daily.   The patient understands the importance of avoiding all alcoholic and sweetened drinks, and instead choosing 64 oz plain water.    Exercise  Nothing routine established. Pt walks 8,000 steps at job.     Pt's understands that 45-60 minutes of daily activity is an important part of weight loss success.   Encouraged pt to incorporate upper body strength training exercise, even if its lifting soup cans while watching tv at night, doing push ups/sit-ups, and abdominal work.    PES statement:    1. (NI-1.3)Excessive energy  intake related to Food and nutrition related knowledge deficit concerning excessive energy/oral intake as evidenced by Intake of high caloric density foods at meals and/or snacks; large portions; frequent grazing; Estimated intake that exceeds estimated daily energy intake;  and BMI 39.        Intervention  Discussion:  1. Educated pt on Eat Better, Move More, Live Well: Non-surgical Weight Loss Handout  2. Recommended to consume 20gm protein at 3 meals daily. 60-70 grams daily total.  3. Educated pt on food labels: keeping total fat grams <10, total sugar grams <10, fiber >3gm per serving.   4. Reviewed Plate Method and gave food journal homework.  5. Gave food journal homework, to be completed for f/u appointment.  6. Discussed healthy meal recipes  7. Plate Method: The patient and I discussed the importance of including lean/low  fat protein at each meal and limiting carbohydrate intake to less  than 25% of plate volume.    Instructions/Goals:   1. Include 20 gm protein at each meal.  2. Increase vegetable/fruit intake, by having a vegetable or fruit with each meal daily. Recommended pt to increase vegetable/fruit intake to 4-5 servings daily.  3. Increase fluid intake to 64oz daily: choose plain or calorie/alcohol-free beverages.  4. Incorporate daily structured activity, 45-60 minutes most days of the week  5. Read food labels more consistently: keeping total fat grams <10, total sugar grams <10, fiber >3gm per serving.  6. Fill out food journal and bring to next month's visit.  7. Practice plate method: 1/2 plate lean/low fat protein source, vegetable/fruit, <25% of plate complex carbohydrates.  8. Practice eating off of smaller plates/bowls, chewing to applesauce consistency, taking 20-30 minutes to eat in a calm/relaxed environment without distractions of tv/email/cell phone.    Goals set by patient:  1. Keep food journal for seven days.   2. Try one new healthy recipe from Healthy Meal idea handout.      Handouts Provided:  Eat Better, Move More, Live Well: Non-surgical Weight Loss Handout  Plate Method  Food journal  Soup Can Exercises (10 bicep curls, 10 shoulder press, 10 lunges, 10 squats, 30 sec. Wall sit, 2-3x/day)  Healthy Meal Ideas     Monitor/Evaluation:    Pt will f/u in one month with bariatrician, and  RD per pt preference.     Plan for next visit with RD:    Review food journal homework.  Exercise  **Discuss low FODMAP diet to assist with IBS.       Time In: 8:30am  Time Out: 9:30am      ABN signed: Yes

## 2021-06-10 NOTE — PROGRESS NOTES
"[facility]:  Non-Surgical Weight Loss Intake Appointment   Date of visit: 4/12/2017  Physician: Andres Montez MD  Primary Care is Carli Orta MD.  Snow Huang   56 y.o.  female  Snow is a 56 y.o. year old female who is here today for consultation regarding non-surgical weight loss.   she was referred to the [facility] by Dr. Pritchard    Weight History:   Wt Readings from Last 3 Encounters:   04/12/17 217 lb (98.4 kg)   12/12/16 205 lb (93 kg)     Body mass index is 41 kg/(m^2).       Assessment and Plan   Assessment: Snow Huang is a 56 y.o.,female presenting for assistance with medical weight loss.  Identified issues contributing to her excess weight include:     Disrupted sleep (snoring /dog/bladder issues and falling asleep to TV at night) tends to lead to poor morning appetite and later hunger in the day. Low protein diet and lack of water encourages some carbohydrate cravings and he  is a \"bad influence\" on her sweets consumption.    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           Plan:  1.  Behavior Goals: 3 daily meals plan, ideally with a large breakfast, medium lunch        and smaller dinner. Avoidance of sugared-sweetened beverages and processed        carbohydrate snacks.  Work towards pre-planning meals to avoid falling back into old             habits/obesogenic habits.  Daily Food Tracking and morning weight recommended.  [facility]       [facility] to increase [facility].    2.  Diet Goals: Recommend a 7092-9427 Calorie daily restriction.  Increased protein intake to insure at        least 20-30 grams of protein per meal.      3.  Exercise/Activity Goals: start working on her strength 3 days weekly..  Long term goal of increasing endurance to allow for at " least            200 minutes weekly of moderate exercise to maintain weight loss.      4.  Recommendation regarding weight loss medication:  Trial of naltrexone given no regular narcotic use. Not a candidate for phentermine today due to effexor use and resultant tachycardia. Tried topamax in the past and wasn't helpful to her. Could consider again in the future.      5.  Referral to Dietician for further education and customization of diet plan.    6.  Stress Reduction: encouraged to ask her  to treat his sleep apnea.    7.  Intake Labs:  Ordered.    8.   Discussed looking into Low FODMAP diet plans given her migraine and IBS history.    1. Obesity, Class III, BMI 40-49.9 (morbid obesity)  Comprehensive Metabolic Panel    HM2(CBC w/o Differential)    Lipid Profile    Vitamin B12    Thiamin (Vitamin B1), Whole Blood    Magnesium    Amb Referral to Bariatric Dietician    naltrexone (DEPADE) 50 mg tablet   2. Vitamin D deficiency   Vitamin D, Total (25-Hydroxy)   3. Tachycardia  Zinc, Serum    Thyroid Stimulating Hormone (TSH)   4. History of IBS  Zinc, Serum   5. Hx of migraine headaches            Return in about 4 weeks (around 5/10/2017).     Weight and Lifestyle History    Sleep history: Goes to bed most nights around 9:30pm-10pm with TV on (auto off 90 minutes). Trazodone to help her sleep. Snoring  and dog in bed and bladder trips causes a restless night. Wakes around 4am on her own. If working, will have coffee with  and fall asleep in chair for until 6-7am. If not working would have breakfast and start her day/dog care/chores.  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 Works 3 days weekly.  Work days are 3 hours long and walks 3-4k steps brings stock out to floor. Home from work by 10:15am, lets dogs out/coffee while they run around TV/email at this time. Brunch sometimes if no breakfast (half a sandwich/fruit or yogurt and dilcia  chips/seltzer water). Afternoons are house chores.  Snack at around 2pm fruit/chips.  Dinner is around 6-6:30pm with .  Dinner is fish/chicken/strips (formerly lots of red meats and hotdish). After dinner,  likes dessert/sweets and so they may have alonzo food cake/berries/nutgoody bars/cookies/pies.  Hours per night: see above  Snoring/apnea/insomnia? Talks a lot in her sleep  Restful/refreshed? Not always (see above).   Shift work? no  CPAP/BiPAP? no  Sleep study previously? yes  TV in bedroom? yes  Sleep aids/pills? yes      STOP-BANG score for sleep apnea : na  For general population   LICHA - Low Risk : Yes to 0 - 2 questions  LICHA - Intermediate Risk : Yes to 3 - 4 questions  LICHA - High Risk : Yes to 5 - 8 questions  or Yes to 2 or more of 4 STOP questions + male gender  or Yes to 2 or more of 4 STOP questions + BMI > 35kg/m2  or Yes to 2 or more of 4 STOP questions + neck circumference 17 inches / 43cm in male or 16 inches / 41cm in female    Weight History:  In what way is your excess weight affecting your wellness/health? Lots of aches and pains  Heaviest weight: current weight.  Any Previous weight loss, what was the most lost and method: WWs back in the day, successful, 20 lbs. Regained and heavier now.  Increased weight after gerardo and hysterctomy.  After chemo for breast cancer.   Birth weIight, High School graduation weight: 120.  Lowest adult weight: after weight watchers about 115 lbs.   Maternal health/smoking/weight: yes.  When did you start gaining weight and what were the circumstances? See above   Is anyone else in your immediate family overweight? Mother is. Brother lost 50 lbs with [facility] diet.  Finally,  Is there a weight you desire to be, what is your goal weight that would define successful weight loss for you? Under 200 lbs.   I would like to lose weight so I can  :  Feel better and have less joint pain.  Formerly a  but after cancer lost strength and hoping to  "start up a new hobby and getting out in the wilderness   .     Barriers to weight loss:  Is anyone else at home/work/family a barrier to your weight loss?   Work schedule/location? See above  Current stressors include: getting therapy,  was out of work 3 weeks for back strain.  Mobility problems: joints.    Counseled on health benefits of weight loss, goals for metabolic/diabetic risk reduction, HTN reduction, improved sleep and mobility, cancer reduction, longevity.    Fitness History:  Were you ever an athlete?no     Which sports?   or long term weight maintenance, goal working towards 200-300 minutes weekly.     Food History:  Who buys groceries?  She does  Do you eat at dinner table, is the TV on or off, family present? TV on.   Any soda, how much? No,   Meals per day? 2-3 depending.  Snacks per day? yes  Breakfast typically is: see above  Lunch typically is: see above  Dinner  is: see above  Weekly Fast Food/dining out: sometimes heavy 3 or more times, usually less or rarely (1-2 times monthly).   Snacks consist of: sweets often    Food sensitivities/allergies/intolerances/avoidances: pineapple/coconut possibly.  Water per day? \"not enough\".     Any binging behavior?no  Any induced vomiting/purging? no  Any history of anorexia/bulimia? no  Any night eating issues? Rarely.  Stress induced eating? Both lose/gain appetite. Lots of bored eating.    Goal Setting:  Short Term Goals 21 lbs is 196 lbs/10%.   Long Term Goals TBD.         Patient Profile   Social History     Social History Narrative    .  No kids.  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     Family History   Problem Relation Age of Onset     Hypertension Mother      Also Vitamin B12 Defiency     Obesity Mother      Stroke Father      Also had pericarditis     Lung disease Father      No Medical Problems Brother      No Medical Problems Brother      Obesity Paternal Grandmother           Past Medical History   Patient Active Problem " List   Diagnosis     Asthma     Malignant neoplasm of breast     Endometriosis     History of hysterectomy     Recurrent major depressive episodes     Migraine without status migrainosus, not intractable     Bilateral polycystic ovarian syndrome     Left leg swelling     Venous hypertension of both lower extremities     Left ankle tendonitis     Non morbid obesity due to excess calories     Diatrizoate meglumine; Nitrofurantoin; Penicillins; Atenolol; Ciprofloxacin; Clotrimazole; Metrogel [metronidazole]; Silver nitrate; Sulfa (sulfonamide antibiotics); and Adhesive tape-silicones  Current Outpatient Prescriptions   Medication Sig Note     albuterol (PROVENTIL HFA;VENTOLIN HFA) 90 mcg/actuation inhaler Inhale 2 puffs 4 (four) times a day as needed for wheezing.      cetirizine (ZYRTEC) 10 MG tablet Take 10 mg by mouth daily.      cyclobenzaprine (FLEXERIL) 10 MG tablet Take 10 mg by mouth 3 (three) times a day as needed for muscle spasms.      ergocalciferol (VITAMIN D2) 50,000 unit capsule Take 50,000 Units by mouth 2 (two) times a week.      fluticasone (VERAMYST) 27.5 mcg/actuation nasal spray 2 sprays into each nostril daily.      prochlorperazine (COMPAZINE) 10 MG tablet Take 10 mg by mouth every 12 (twelve) hours as needed for nausea. 4/12/2017: Used for migraines PRN.     SUMAtriptan (IMITREX) 6 mg/0.5 mL Soln Inject 6 mg under the skin once as needed.      traZODone (DESYREL) 50 MG tablet Take 1-2 tablets by mouth at bedtime. [facility]12[facility]2017: [facility] Sig: TAKE 2 TABLETS BY MOUTH EVERY NIGHT AT BEDTIME     venlafaxine (EFFEXOR-XR) 150 MG 24 hr capsule Take 450 mg by mouth daily.      EPINEPHrine (EPIPEN) 0.3 mg/0.3 mL atIn Inject 0.3 mg into the shoulder, thigh, or buttocks once.      naltrexone (DEPADE) 50 mg tablet Days 0-10: Half a tablet in the am. Days 11 and up: Half tablet in am and half tablet in pm. Do not mix with narcotics.        Past Surgical  "History  She has a past surgical history that includes Partial hysterectomy; Cholecystectomy; and Breast lumpectomy.     Examination   /73  Pulse (!) 103  Resp 16  Ht 5' 1\" (1.549 m)  Wt 217 lb (98.4 kg)  SpO2 99%  BMI 41 kg/m2  Height: 5' 1\" (1.549 m) (4/12/2017 12:36 PM)  Initial Weight: 217 lbs (4/12/2017 12:36 PM)  Weight: 217 lb (98.4 kg) (4/12/2017 12:36 PM)  Weight loss from initial: 0 (4/12/2017 12:36 PM)  % Weight loss: 0 % (4/12/2017 12:36 PM)  BMI (Calculated): 41 (4/12/2017 12:36 PM)  SpO2: 99 % (4/12/2017 12:36 PM)  Waist Circumference (In): 45 Inches (4/12/2017 12:36 PM)  Hip Circumference (In): 48 Inches (4/12/2017 12:36 PM)  Neck Circumference (In): 14 Inches (4/12/2017 12:36 PM)  General:  Alert and ambulatory, no distress.  HEENT:  No conjunctival pallor, moist mucous Membranes, neck is supple.   Pulmonary:  Normal respiratory effort, no cough, no audible wheezes/crackles.  CV:  Regular rate and Rhythm, no murmurs, pulses.   Abdominal: soft, nontender  Extremities: normal gait, short stature.  Skin:  No pallor or jaundice  Pscyh/Mood: pleasant, cooperative and moderatly motivated for change                                    LABS: ordered    Last recorded labs include:  Lab Results   Component Value Date    WBC 4.4 10/21/2013    HGB 13.3 10/21/2013    HCT 39.8 10/21/2013    MCV 95 10/21/2013     10/21/2013      Lab Results   Component Value Date    CVKNGPMO38EV 22.6 (L) 03/05/2015    No results found for: HGBA1C   Lab Results   Component Value Date    CHOL 257 (H) 09/02/2015    No results found for: PTH      No results found for: FERRITIN   Lab Results   Component Value Date    HDL 84 09/02/2015      Lab Results   Component Value Date    MMTVFPWY54 599 08/27/2014    No results found for: 56205   Lab Results   Component Value Date    LDLCALC 162 (H) 09/02/2015    Lab Results   Component Value Date    TSH 1.29 12/03/2015    No results found for: FOLATE   Lab Results   Component " Value Date    TRIG 54 2015    Lab Results   Component Value Date    ALT 11 2016    AST 14 2016    ALKPHOS 98 2016    BILITOT 0.4 2016    Lab Results   Component Value Date    TESTOSTERONE 70 (H) 2015        No components found for: CHOLHDL No results found for: 7597   @resusfast(vitamin a: 1)@       Other Notables/imaging:     Counselin minutes spent in direct consultation with the patient regarding conditions contributing to excess weight accumulation, with over 50% of the time spent in counseling, goal setting and initiating a plan to lose their excess weight.    Andres Montez MD  [facility].  2017

## 2021-06-10 NOTE — PROGRESS NOTES
Date of Service: 05/10/2017     Date last seen by Dr. Pritchard:  16    PCP: Carli Orta MD      Impression:  1.  Left leg swelling  2.  Venous hypertension of the legs bilaterally  3.  Post traumatic swelling of left leg  4.  Obesity  5.  Bilateral breast cancer (2007 left mastectomy, right lumpectomy with lymph node dissection followed by chemotherapy and radiation)  Plan:  1. Type of swelling was reviewed in detail with the patient.  Questions were answered and education was completed.  2.  Venous insufficiency ultrasound with slight insufficiency left leg-results reviewed.   3.  Compression stockings. Continue to wear.  4.  Encouraged more exercise. Suggestions given.    5.  Bariatric medicine clinic-continue to work with Dr. Montez    6.  Patient will follow up in 8 months or when needed.  If left leg continues to be bothersome vascular surgical consult will be considered.  At this time, however, she is responding nicely to conservative therapy and, hopefully, this will continue to do the trick for her.    Time spent with patient 15 minutes with greater than 50% time in consultation, education and coordination of care.     ---------------------------------------------------------------------------------------------------------------------    Chief Complaint: Left leg swelling     History of Present Illness:     Snow Huang returns to the [facility] with left leg swelling due to venous insufficiency. Venous insufficieny study was done and showed some disease of the left leg.  She is now wearing much better compression stockings and working with bariatrics.  She has lost 10 pounds.   She is feeling mch better.  The pain in the left leg is almost gone.    It is noted she also right arm post mastectomy lymphedema after a diagnosis of breast cancer in  and had right lumpectomy with LND, then chemo and radiation.  There has been no new numbness, tingling or weakness.  There  have been no new masses, rashes, or swellings of any other joints. There has been no new decrease in appetite, unexplained weight loss, abdominal bloating, bowel or bladder changes and irregular bleeding.  She is on vit d replacement for deficiency.  She wears good custom arch supports.       Past Medical History:   Diagnosis Date     Cholelithiasis      De Quervain's disease (radial styloid tenosynovitis)     Tendonitis     Depression      History of breast cancer      Irritable bowel      Lymphedema of both lower extremities      Migraine      PCOS (polycystic ovarian syndrome)      Plantar fasciitis, bilateral      Restless legs      Urinary incontinence      Venous hypertension of both lower extremities      Vitamin B12 deficiency        Past Surgical History:   Procedure Laterality Date     BREAST LUMPECTOMY      R breast cancer     CHOLECYSTECTOMY       PARTIAL HYSTERECTOMY           Current Outpatient Prescriptions:      albuterol (PROVENTIL HFA;VENTOLIN HFA) 90 mcg/actuation inhaler, Inhale 2 puffs 4 (four) times a day as needed for wheezing., Disp: , Rfl:      cetirizine (ZYRTEC) 10 MG tablet, Take 10 mg by mouth daily., Disp: , Rfl:      cyclobenzaprine (FLEXERIL) 10 MG tablet, Take 10 mg by mouth 3 (three) times a day as needed for muscle spasms., Disp: , Rfl:      EPINEPHrine (EPIPEN) 0.3 mg/0.3 mL atIn, Inject 0.3 mg into the shoulder, thigh, or buttocks once., Disp: , Rfl:      ergocalciferol (VITAMIN D2) 50,000 unit capsule, Take 50,000 Units by mouth 2 (two) times a week., Disp: , Rfl:      fluticasone (VERAMYST) 27.5 mcg/actuation nasal spray, 2 sprays into each nostril daily., Disp: , Rfl:      naltrexone (DEPADE) 50 mg tablet, Days 0-10: Half a tablet in the am. Days 11 and up: Half tablet in am and half tablet in pm. Do not mix with narcotics., Disp: 45 tablet, Rfl: 0     prochlorperazine (COMPAZINE) 10 MG tablet, Take 10 mg by mouth every 12 (twelve) hours as needed for nausea., Disp: , Rfl:      " SUMAtriptan (IMITREX) 6 mg/0.5 mL Soln, Inject 6 mg under the skin once as needed., Disp: , Rfl:      traZODone (DESYREL) 50 MG tablet, Take 1-2 tablets by mouth at bedtime., Disp: , Rfl:      venlafaxine (EFFEXOR-XR) 150 MG 24 hr capsule, Take 450 mg by mouth daily., Disp: , Rfl:     Allergies   Allergen Reactions     Diatrizoate Meglumine Anaphylaxis     IV Contrast Dye     Nitrofurantoin Anaphylaxis     Macrobid     Penicillins Anaphylaxis     Atenolol Other (See Comments)     Hypotension     Ciprofloxacin Other (See Comments)     \"becomes irritable\"     Clotrimazole Itching     Metrogel [Metronidazole] Other (See Comments)     Itching inside     Silver Nitrate Other (See Comments)     Causes skin infection     Sulfa (Sulfonamide Antibiotics) Unknown     Adhesive Tape-Silicones Itching and Rash       Social History     Social History     Marital status:      Spouse name: N/A     Number of children: N/A     Years of education: N/A     Occupational History     Not on file.     Social History Main Topics     Smoking status: Former Smoker     Packs/day: 0.50     Years: 25.00     Quit date: 12/12/2007     Smokeless tobacco: Never Used     Alcohol use Yes      Comment: Very rare glass of wine     Drug use: No     Sexual activity: Yes     Birth control/ protection: Post-menopausal     Other Topics Concern     Not on file     Social History Narrative    .  No kids.  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       Family History   Problem Relation Age of Onset     Hypertension Mother      Also Vitamin B12 Defiency     Obesity Mother      Stroke Father      Also had pericarditis     Lung disease Father      No Medical Problems Brother      No Medical Problems Brother      Obesity Paternal Grandmother        Review of Systems:  Snow Huang no new numbess, tingling or weakness, redness or rashes, fevers, new masses, abdominal bloating or discomfort, unexplained weight loss, increased pain, new ulcers, " shortness of breath and chest pain  Full 12 point review of systems was completed.    Imaging:    LEFT LOWER EXTREMITY VENOUS DUPLEX ULTRASOUND WITH INSUFFICIENCY STUDY  12/19/2016 2:26 PM     INDICATIONS: Left leg pain and swelling.     TECHNIQUE: Venous duplex ultrasound with gray-scale images, graded compression, and color-flow, Doppler, and spectral analysis. Abnormal reflux is defined as 500 msec for superficial veins and 1 sec for deep veins.  COMPARISONS: 3/22/2013.     FINDINGS: The left greater saphenous vein demonstrates abnormal reflux, only at the saphenofemoral junction. The vein otherwise appears competent, measuring between 7 mm in diameter at saphenofemoral junction and 3 mm in the distal calf. No abnormal   reflux is detected in the left lesser saphenous vein.     Abnormal reflux is detected in the upper segment of the left superficial femoral vein. No other significant deep venous reflux is detected, and no incompetent perforating veins are identified.     There is no evidence for deep vein thrombosis. There is normal flow and compressibility in the femoral, popliteal, and posterior tibial veins.     CONCLUSIONS:   1.  Abnormal reflux in the left greater saphenous vein, only at the saphenofemoral junction.  2.  No evidence for left lower extremity deep venous thrombosis.    Labs:  Lab Results   Component Value Date    SEDRATE 10 03/05/2015         No results found for: CRP        Lab Results   Component Value Date    CREATININE 0.81 04/20/2017      No results found for: HGBA1C        Lab Results   Component Value Date    BUN 19 04/20/2017              Lab Results   Component Value Date    ALBUMIN 4.0 04/20/2017       Vitamin D, Total (25-Hydroxy)   Date Value Ref Range Status   04/20/2017 41.0 30.0 - 80.0 ng/mL Final       Lab Results   Component Value Date    TSH 0.95 04/20/2017     Lab Results   Component Value Date    WBC 4.1 04/20/2017    HGB 14.0 04/20/2017    HCT 43.9 04/20/2017    MCV 98  04/20/2017     04/20/2017         Physical Exam:  Vitals:    05/10/17 1246   BP: 100/60   Pulse: 72   Resp: 16   Temp: 98.6  F (37  C)      BMI 39.34    Circumferential measures:    Vasc Edema 12/12/2016 5/10/2017   Right-just above MCP 19 -   Right Wrist 17 -   Right Up 10cm 26 -   Right Up 10cm From Elbow 39 -   Left-just above MCP 18.5 -   Left Wrist 17.2 -   Left Up 10cm 25 -   Left Up 10cm From Elbow 37.5 -   Right just above MTP 21.3 21.2   Right Ankle 24.3 23.8   Right Widest Calf 47.4 46.2   Right Thigh Up 10cm 59.5 -   Left - just above MTP 21.5 21.3   Left Ankle 25.4 24.8   Left Widest Calf 47.7 47.4   Left Thigh Up 10cm 62 -     Circumferential measures improving nicely.     General:  56 y.o. female in no apparent distress.  Alert and oriented x 3.  Cooperative. Affect normal.    Musculoskeletal:  Normal range of motion in hips, knees and ankles bilaterally throughout .  Slight tightness in the ankles bilaterally.  No significant pain along palpation of the left Achilles tendon nor along the plantar fascia.  There is no active joint synovitis, erythema, swelling or joint laxity.      Neurological:  Sensation is intact to pin prick and light touch in both legs.  Strength testing is normal in hip flexion, knee flexion, knee extension, ankle dorsiflexion and great toe extension bilaterally. Deep tendon reflexes, knee jerks and ankle jerks, are normal bilaterally.      Vascular: Dorsalis pedis and posterior tibialis pulses are strong and equal bilaterally and reveal audible biphasic waves with a hand held doppler bilaterally. There are no significant telangietasias, medial ankle venous flares, venous varicosities  and spider veins . There is normal capillary refill.      Integumentary: Skin of the legs is uniformly warm and dry and with negative Stemmer's Sign.  There is significant fibrosis of the feet and toes.  Nails are normal.       Edna Pritchard MD, ABWMS, FACCWS, [facility]  Medical Director  Wound Care and Lymphedema  [facility]  [phone]

## 2021-06-10 NOTE — PROGRESS NOTES
"Non-surgical Weight Loss Follow Up Diet Evaluation    Assessment:  This patient is a 56 y.o. female is being seen today for follow-up non-surgical nutritional evaluation. Today we reviewed the patients current eating habits and level of physical activity, and instructed on the changes that are required for successful weight loss outcomes.    Phentermine: None. Pt reports having it there, but would like to \"not have to use it\".     Pt's Initial Weight: 217 lbs  Weight: 206 lb 4.8 oz (93.6 kg)  Weight loss from initial: 10.7  % Weight loss: 4.93 %  BMI: Body mass index is 38.98 kg/(m^2).  IBW: 105 lbs    Estimated RMR (Moultrie-St Jeor equation): 1480 calories  Protein requirements (.5grams to .9grams per pound IBW, 20-30% of calories, minimum of 60-80gm per day):  52-94 grams    Progress made since last visit: Pt reports struggling with figuring out healthy breakfast and getting adequate fluid intake. Pt has been keeping track of food intake on phone. Pt reports watching what she eats and finds that she will resort to protein and vegetable when stressed. Pt reports being more crabby. Pt's  shows love through food, which can inhibit motivation to eat healthy.     Concerns: low CHO intake, sedentary lifestyle.       Diet Recall/Time:   Breakfast: lunch meat wrapped around 2 cheese sticks or protein shake (20g)   Am Snack: none   Lunch: salad with lunch meat with fruit or chicken with raw vegetables (20g)  Pm snack: fruit   Dinner: meat, vegetable (20g)  HS Snack: none     Meals per week away from home: 1-2x/week      Recommended limiting eating out to no more than 2x/week.  Patient and I reviewed the importance of eating three consistent meals per day; as well as meal timing to be spaced 4-5 hours apart.  Snack choices: 100-150 calories (1-2x/day if physically hungry), incorporating a fruit/vegetable w/ protein source.    Meal Duration: 30 minutes    Portion Sizes problematic? YES per patient/diet " recall  Encouraged slowing meal times down, 20-30 minutes, chewing to applesauce consistency.   To aid in proper portion control and slow meal time down discussed consuming meals off smaller plates, use toddler/children utensils and set utensils down after each bite.    Protein, vegetables/fruits, carbohydrates:   The patient and I discussed the importance of including lean/low fat protein at each meal and limiting carbohydrate intake to less than 25% of plate volume.       Vitamins/Mineral Supplementation: vitamin D, B complex.     Beverages (Type/Oz. per day)  Water: 60-70 oz  Coffee: 1 cup  Tea: none   Milk: none   Regular soda: none   Diet soda: none   Juice: none   Ankit-Aid/lemonade/etc: Gatorade 3x/week   Alcohol: none     Discussed the importance of adequate hydration and the goal of 64+ oz of fluid daily.   The patient understands the importance of  avoiding all sweetened and alcoholic drinks, and instead choosing 64 oz plain water.    Exercise  Pt walking a lot with work and walking dogs.     Pt's understands that 45-60 minutes of daily activity is an important part of weight loss success.   Encouraged pt to incorporate  strength training exercise in addition to cardiovascular exercise most days of the week.    PES statement:     1.(NI-1.3)Excessive energy intake related to Food and nutrition related knowledge deficit concerning excessive energy/oral intake as evidenced by Intake of high caloric density foods at meals and/or snacks; large portion;  and BMI 38.         Intervention:  Discussion:  1. Educated pt on Eat Better, Move More, Live Well: Non-surgical Weight Loss Handout  2. Reviewed lean protein sources.  20gm protein at 3 meals daily. 60-80 grams daily total.  3. Encouraged pt to pair protein intake with a source of carb at every meal,with emphasis on whole grains and fruit.   4. Carbohydrate Countin carbohydrate choice/serving = 15-20gm total carbohydrate   5. Reviewed Plate Method   6. Plate  Method: The patient and I discussed the importance of including lean/low  fat protein at each meal and limiting carbohydrate intake to less  than 25% of plate volume.    Instructions/Goals:   1. Include 20gm protein at each meal.  2. Increase vegetable/fruit intake, by having a vegetable or fruit with each meal daily. Recommended pt to increase vegetable/fruit intake to 4-5 servings daily.  3. Increase fluid intake to 64oz daily: choose plain or calorie/alcohol-free beverages.- *eliminate Gatorade intake   4. Incorporate daily structured activity, 45-60 minutes most days of the week- Encouraged more high intensity workouts to increase heart rate and calories burned.   5. Read food labels more consistently: keeping total fat grams <10, total sugar grams <10, fiber >3gm per serving.   6. Practice plate method: 1/2 plate lean/low fat protein source, vegetable/fruit, <25% of plate complex carbohydrates.  7. Carbohydrates from grain sources at meal times to be no more than 1 Carb Choice, ie: 15-20 gm total carbohydrate per serving  8. Practice eating off of smaller plates/bowls, chewing to applesauce consistency, taking 20-30 minutes to eat in a calm/relaxed environment without distractions of tv/email/cell phone.    Handouts Provided:  Eat Better, Move More, Live Well: Non-surgical Weight Loss Handout      Monitor/Evaluation:    Pt will f/u in one month with bariatrician and RD     Plan for next visit with RD:    Educated pt on food labels  Review carbohydrates/fiber  Exercise      Time In: 8:30am  Time Out: 9:00am       ABN signed: Yes

## 2021-06-10 NOTE — PROGRESS NOTES
Patient here for consult regarding non-surgical weight loss. Initial labs ordered today and patient instructed to do as soon as possible.  Helped to set up future appointments.   Measurements and photos also taken today.  Patient verbalized understanding at this time without any questions.      Beatriz Mejia RN, CBN  [facility]  P [phone]  F [phone]

## 2021-06-11 NOTE — PROGRESS NOTES
"Bariatric Clinic Follow-Up Visit:    Snow Huang is a 56 y.o.  female with There is no height or weight on file to calculate BMI.  presenting here today for follow-up on non-surgical efforts for weight loss. Original Intake visit occurred on 4/27/17 with a weight of 217 lbs and BMI of 41.  Along with diet and behavior changes, she has been using naltrexone to assist her weight loss goals.  See her intake visit notes for details on identified contributors to weight gain in the past.    Weight:   Wt Readings from Last 2 Encounters:   05/18/17 206 lb 4.8 oz (93.6 kg)   04/20/17 208 lb 3.2 oz (94.4 kg)    pounds  Height: 5' 1\" (1.549 m) (5/18/2017  8:00 AM)  Initial Weight: 217 lbs (5/18/2017  8:00 AM)  Weight: 206 lb 4.8 oz (93.6 kg) (5/18/2017  8:00 AM)  Weight loss from initial: 10.7 (5/18/2017  8:00 AM)  % Weight loss: 4.93 % (5/18/2017  8:00 AM)  BMI (Calculated): 39 (5/18/2017  8:00 AM)  SpO2: 99 % (4/12/2017 12:36 PM)  Waist Circumference (In): 45 Inches (4/12/2017 12:36 PM)  Hip Circumference (In): 48 Inches (4/12/2017 12:36 PM)  Neck Circumference (In): 14 Inches (4/12/2017 12:36 PM)    Comorbidities:  Patient Active Problem List   Diagnosis     Asthma     Malignant neoplasm of breast     Endometriosis     History of hysterectomy     Recurrent major depressive episodes     Migraine without status migrainosus, not intractable     Bilateral polycystic ovarian syndrome     Left leg swelling     Venous hypertension of both lower extremities     Left ankle tendonitis     Non morbid obesity due to excess calories     Venous insufficiency of left leg       Current Outpatient Prescriptions:      albuterol (PROVENTIL HFA;VENTOLIN HFA) 90 mcg/actuation inhaler, Inhale 2 puffs 4 (four) times a day as needed for wheezing., Disp: , Rfl:      cetirizine (ZYRTEC) 10 MG tablet, Take 10 mg by mouth daily., Disp: , Rfl:      cyclobenzaprine (FLEXERIL) 10 MG tablet, Take 10 mg by mouth 3 (three) times a day as needed " for muscle spasms., Disp: , Rfl:      EPINEPHrine (EPIPEN) 0.3 mg/0.3 mL atIn, Inject 0.3 mg into the shoulder, thigh, or buttocks once., Disp: , Rfl:      ergocalciferol (VITAMIN D2) 50,000 unit capsule, Take 50,000 Units by mouth 2 (two) times a week., Disp: , Rfl:      fluticasone (VERAMYST) 27.5 mcg/actuation nasal spray, 2 sprays into each nostril daily., Disp: , Rfl:      prochlorperazine (COMPAZINE) 10 MG tablet, Take 10 mg by mouth every 12 (twelve) hours as needed for nausea., Disp: , Rfl:      SUMAtriptan (IMITREX) 6 mg/0.5 mL Soln, Inject 6 mg under the skin once as needed., Disp: , Rfl:      traZODone (DESYREL) 50 MG tablet, Take 1-2 tablets by mouth at bedtime., Disp: , Rfl:      venlafaxine (EFFEXOR-XR) 150 MG 24 hr capsule, Take 450 mg by mouth daily., Disp: , Rfl:      naltrexone (DEPADE) 50 mg tablet, Days 0-10: Half a tablet in the am. Days 11 and up: Half tablet in am and half tablet in pm. Do not mix with narcotics., Disp: 45 tablet, Rfl: 0      Interim: Since our last visit, she has lost 8 lbs.    Plan:   1.  Diet: aiming for 0612-3813 kcal daily.  2. Exercise: continue walking/chores.  3. Medication: will start her naltrexone since she's not yet tried.  4.  Stress Reduction:  Doing well, discussed her 's sabotaging efforts.  5. Goals: 198 is 10% weight loss.        We discussed [facility] Bariatric Basics including:  -eating 3 meals daily  -eating protein first  -eating slowly, chewing food well  -avoiding/limiting calorie containing beverages  -We discussed the importance of restorative sleep and stress management in maintaining a healthy weight.  -We discussed the National Weight Control Registry healthy weight maintenance strategies and ways to optimize metabolism.  -We discussed the importance of physical activity including cardiovascular and strength training in maintaining a healthier weight and explored viable options.    Most recent labs:  Lab Results   Component Value Date     "WBC 4.1 04/20/2017    HGB 14.0 04/20/2017    HCT 43.9 04/20/2017    MCV 98 04/20/2017     04/20/2017     Lab Results   Component Value Date    CHOL 250 (H) 04/20/2017     Lab Results   Component Value Date     04/20/2017     Lab Results   Component Value Date    LDLCALC 138 (H) 04/20/2017     Lab Results   Component Value Date    TRIG 62 04/20/2017     No components found for: CHOLHDL  Lab Results   Component Value Date    ALT 19 04/20/2017    AST 21 04/20/2017    ALKPHOS 115 04/20/2017    BILITOT 0.3 04/20/2017     No results found for: HGBA1C  Lab Results   Component Value Date    SDEVWDQV12 319 04/20/2017     Lab Results   Component Value Date    RQBTPZQP96IL 41.0 04/20/2017     No results found for: FERRITIN  No results found for: PTH  Lab Results   Component Value Date    70377 114 04/20/2017     No results found for: 7597  Lab Results   Component Value Date    TSH 0.95 04/20/2017     Lab Results   Component Value Date    TESTOSTERONE 70 (H) 03/05/2015     No recent illness, no chest pain/complaints.    PHYSICAL EXAM:  Vitals: There were no vitals taken for this visit.  Height: 5' 1\" (1.549 m) (5/18/2017  8:00 AM)  Initial Weight: 217 lbs (5/18/2017  8:00 AM)  Weight: 206 lb 4.8 oz (93.6 kg) (5/18/2017  8:00 AM)  Weight loss from initial: 10.7 (5/18/2017  8:00 AM)  % Weight loss: 4.93 % (5/18/2017  8:00 AM)  BMI (Calculated): 39 (5/18/2017  8:00 AM)  SpO2: 99 % (4/12/2017 12:36 PM)  Waist Circumference (In): 45 Inches (4/12/2017 12:36 PM)  Hip Circumference (In): 48 Inches (4/12/2017 12:36 PM)  Neck Circumference (In): 14 Inches (4/12/2017 12:36 PM)    GEN: Pleasant, well groomed, in no acute distress  HEENT: PEERLA, EOMI, airway clear. .  NECK:  no anterior/supraclavicular lymphadenopathy, thyroid normal   HEART: Rhythm regular, rate regular, no murmur   LUNGS: Clear without crackles or wheezes. No cough.  ABDOMEN: obese..  EXTREMITIES: no tremor.   NEURO: Alert and Oriented X3, normal gait and " speech..  SKIN: No visible rashes.        25 minutes was spent in direct consultation, with over 50% of it spent in counseling regarding their plan for excess weight reduction and health modification.  Andres Montez MD  [facility]  12:57 PM

## 2021-06-12 NOTE — PROGRESS NOTES
"Bariatric Clinic Follow-Up Visit:    Snow Huang is a 56 y.o.  female with Body mass index is 37.6 kg/(m^2).  presenting here today for follow-up on non-surgical efforts for weight loss. Original Intake visit occurred on 4/12/17 with a weight of 217 lbs and BMI of 41.  Along with diet and behavior changes, she has been using naltrexone and was recently started on topamax by her psychologist to assist her weight loss goals.  See her intake visit notes for details on identified contributors to weight gain in the past.    Weight:   Wt Readings from Last 2 Encounters:   08/16/17 199 lb (90.3 kg)   06/02/17 209 lb 9.6 oz (95.1 kg)    pounds  Height: 5' 1\" (1.549 m) (8/16/2017 11:52 AM)  Initial Weight: 217 lbs (8/16/2017 11:52 AM)  Weight: 199 lb (90.3 kg) (8/16/2017 11:52 AM)  Weight loss from initial: 18 (8/16/2017 11:52 AM)  % Weight loss: 8.29 % (8/16/2017 11:52 AM)  BMI (Calculated): 37.6 (8/16/2017 11:52 AM)  SpO2: 100 % (8/16/2017 11:52 AM)  Heart Rate (/min): 92 (6/2/2017  1:02 PM)  BP (mmHg): 115/64 (6/2/2017  1:02 PM)  Waist Circumference (In): 40 Inches (8/16/2017 11:52 AM)  Hip Circumference (In): 47 Inches (8/16/2017 11:52 AM)  Neck Circumference (In): 14 Inches (8/16/2017 11:52 AM)    Comorbidities:  Patient Active Problem List   Diagnosis     Asthma     Malignant neoplasm of breast     Endometriosis     History of hysterectomy     Recurrent major depressive episodes     Migraine without status migrainosus, not intractable     Bilateral polycystic ovarian syndrome     Left leg swelling     Venous hypertension of both lower extremities     Left ankle tendonitis     Non morbid obesity due to excess calories     Venous insufficiency of left leg       Current Outpatient Prescriptions:      albuterol (PROVENTIL HFA;VENTOLIN HFA) 90 mcg/actuation inhaler, Inhale 2 puffs 4 (four) times a day as needed for wheezing., Disp: , Rfl:      cetirizine (ZYRTEC) 10 MG tablet, Take 10 mg by mouth daily., Disp: , " Rfl:      cyclobenzaprine (FLEXERIL) 10 MG tablet, Take 10 mg by mouth 3 (three) times a day as needed for muscle spasms., Disp: , Rfl:      EPINEPHrine (EPIPEN) 0.3 mg/0.3 mL atIn, Inject 0.3 mg into the shoulder, thigh, or buttocks once., Disp: , Rfl:      ergocalciferol (VITAMIN D2) 50,000 unit capsule, Take 50,000 Units by mouth 2 (two) times a week., Disp: , Rfl:      fluticasone (VERAMYST) 27.5 mcg/actuation nasal spray, 2 sprays into each nostril daily., Disp: , Rfl:      montelukast (SINGULAIR) 10 mg tablet, Take 10 mg by mouth daily., Disp: , Rfl:      naltrexone (DEPADE) 50 mg tablet, Days 0-10: Half a tablet in the am. Days 11 and up: Half tablet in am and half tablet in pm. Do not mix with narcotics., Disp: 45 tablet, Rfl: 0     prochlorperazine (COMPAZINE) 10 MG tablet, Take 10 mg by mouth every 12 (twelve) hours as needed for nausea., Disp: , Rfl:      SUMAtriptan (IMITREX) 6 mg/0.5 mL Soln, Inject 6 mg under the skin once as needed., Disp: , Rfl:      topiramate (TOPAMAX) 50 MG tablet, Take 50 mg by mouth daily., Disp: , Rfl:      traZODone (DESYREL) 50 MG tablet, Take 1-2 tablets by mouth at bedtime., Disp: , Rfl:      venlafaxine (EFFEXOR-XR) 150 MG 24 hr capsule, Take 450 mg by mouth daily., Disp: , Rfl:       Interim: Since our last visit, she has lost additional weight. Wants to keep going. Her dog  yesterday.    Plan:   1.  Diet: 1845-4897 kcal and 60-80 g/protein daily.  2. Exercise: walking dog more.  3. Medication: refill of naltrexone, her psychologist is prescribing the topmax.  4.  Stress Reduction:  Managing grief.  5. Goals: next goal is 185 lbs, has hit her 10% weight loss.        We discussed [facility]ariat[facility] Basics including:  -eating 3 meals daily  -eating protein first  -eating slowly, chewing food well  -avoiding/limiting calorie containing beverages  -We discussed the importance of restorative sleep and stress management in maintaining a healthy weight.  -We discussed the  National Weight Control Registry healthy weight maintenance strategies and ways to optimize metabolism.  -We discussed the importance of physical activity including cardiovascular and strength training in maintaining a healthier weight and explored viable options.    Most recent labs:  Lab Results   Component Value Date    WBC 4.1 04/20/2017    HGB 14.0 04/20/2017    HCT 43.9 04/20/2017    MCV 98 04/20/2017     04/20/2017     Lab Results   Component Value Date    CHOL 250 (H) 04/20/2017     Lab Results   Component Value Date     04/20/2017     Lab Results   Component Value Date    LDLCALC 138 (H) 04/20/2017     Lab Results   Component Value Date    TRIG 62 04/20/2017     No components found for: CHOLHDL  Lab Results   Component Value Date    ALT 19 04/20/2017    AST 21 04/20/2017    ALKPHOS 115 04/20/2017    BILITOT 0.3 04/20/2017     No results found for: HGBA1C  Lab Results   Component Value Date    XAGBJDSB87 319 04/20/2017     Lab Results   Component Value Date    ZAQTJQBQ06RT 41.0 04/20/2017     No results found for: FERRITIN  No results found for: PTH  Lab Results   Component Value Date    42703 114 04/20/2017     No results found for: 7597  Lab Results   Component Value Date    TSH 0.95 04/20/2017     Lab Results   Component Value Date    TESTOSTERONE 70 (H) 03/05/2015       DIETARY HISTORY    Positive Changes Since Last Visit: walking regularly  Struggling With: grief    Knowledgeable in Reading Food Labels: no  Getting Adequate Protein: yes  Sleeping 7-8 hours/day often  Stress management is tough with her grief.    PHYSICAL ACTIVITY PATTERNS:  Cardiovascular: walking  Strength Training: no issues    REVIEW OF SYSTEMS  GENERAL/CONSTITUTIONAL:  Grieving.  HEENT:   nl  CARDIOVASCULAR:   n  PULMONARY:   l  GASTROINTESTINAL:  nl  UROLOGIC:  nl  NEUROLOGIC:  n  PSYCHIATRIC:  grieving  MUSCULOSKELETAL/RHEUMATOLOGIC   no injuries  ENDOCRINE:  no  DERMATOLOGIC:  No issues.    PHYSICAL EXAM:  Vitals:  "/57  Pulse 94  Resp 18  Ht 5' 1\" (1.549 m)  Wt 199 lb (90.3 kg)  SpO2 100%  BMI 37.6 kg/m2  Height: 5' 1\" (1.549 m) (8/16/2017 11:52 AM)  Initial Weight: 217 lbs (8/16/2017 11:52 AM)  Weight: 199 lb (90.3 kg) (8/16/2017 11:52 AM)  Weight loss from initial: 18 (8/16/2017 11:52 AM)  % Weight loss: 8.29 % (8/16/2017 11:52 AM)  BMI (Calculated): 37.6 (8/16/2017 11:52 AM)  SpO2: 100 % (8/16/2017 11:52 AM)  Heart Rate (/min): 92 (6/2/2017  1:02 PM)  BP (mmHg): 115/64 (6/2/2017  1:02 PM)  Waist Circumference (In): 40 Inches (8/16/2017 11:52 AM)  Hip Circumference (In): 47 Inches (8/16/2017 11:52 AM)  Neck Circumference (In): 14 Inches (8/16/2017 11:52 AM)    GEN: Pleasant, well groomed, in no acute distress  HEENT: PEERLA, EOMI, airway clear .  NECK: no visible swelling.  ABDOMEN: obese..  EXTREMITIES: no tremor, slight limp to gait. .  NEURO: Alert and Oriented X3, normal gait and speech.  SKIN: No visible rashes.        25 minutes was spent in direct consultation, with over 50% of it spent in counseling regarding their plan for excess weight reduction and health modification.  Andres Montez MD  [facility]  12:02 PM    "

## 2021-06-14 NOTE — PROGRESS NOTES
Date of Service:  17    Date last seen by Dr. Pritchard:  05/10/17    PCP: Carli Orta MD    Impression:  1.  Left leg swelling  2.  Venous hypertension of the legs bilaterally  3.  Post traumatic swelling of left leg  4.  Increased left leg pain with elevation, ?Baker's cyst vs. claudication  5.  Bilateral foot pain with plantar fasciitis  5.  Obesity  6.  Bilateral breast cancer (2007 left mastectomy, right lumpectomy with lymph node dissection followed by chemotherapy and radiation)    Plan:  1.  Questions were answered.  2.  Encouraged more exercise.   3.  Compression stockings. Continue to wear.  4.  Bariatric medicine clinic-continue to work with Dr. Montez  5.  WIll see if insurance will cover custom imsoles.  Written.  Also discussed the use of night splints for feet.  She will get addition one OTC.  6.  DAYAMI's with past smoking history and leg pain at night to check on arterial status.    7.  Patient will follow up in 8 months or when needed.  If left leg continues to be bothersome vascular surgical consult will be considered.  At this time, however, she is responding nicely to conservative therapy.    Time spent with patient 15 minutes with greater than 50% time in consultation, education and coordination of care.     ---------------------------------------------------------------------------------------------------------------------    Chief Complaint: Left leg swelling     History of Present Illness:     Snow Huang returns to the [facility] with left leg swelling due to venous insufficiency. Venous insufficiency study was done and showed some disease of the left leg.  She is now wearing much better compression stockings and working with bariatrics.  Her weight continues to decrease and she feels much better with her weight.  The pain in the left has changed and is bothering more at night, starting to wake her up.  She has a known Baker's cyst in the left knee.   It  is noted she also right arm post mastectomy lymphedema after a diagnosis of breast cancer in 2007 and had right lumpectomy with LND, then chemo and radiation.  She feels this is doing fine and not causing her any problems.  There has been no new numbness, tingling or weakness. There have been no new masses, rashes, or swellings of any other joints. There has been no new decrease in appetite, unexplained weight loss, abdominal bloating, bowel or bladder changes and irregular bleeding.  She is on vit d replacement for deficiency.  She wears good over the counter arch supports, but continues to have pain in the bottom of her feet, especially by the end of the day.  She has an OTC right foot splint that she got inexpensively as she was able to order it for a discount as a [employer] employee.        Past Medical History:   Diagnosis Date     Cholelithiasis      De Quervain's disease (radial styloid tenosynovitis)     Tendonitis     Depression      History of breast cancer      Irritable bowel      Lymphedema of both lower extremities      Migraine      PCOS (polycystic ovarian syndrome)      Plantar fasciitis, bilateral      Restless legs      Urinary incontinence      Venous hypertension of both lower extremities      Vitamin B12 deficiency        Past Surgical History:   Procedure Laterality Date     BREAST LUMPECTOMY      R breast cancer     CHOLECYSTECTOMY       PARTIAL HYSTERECTOMY           Current Outpatient Prescriptions:      albuterol (PROVENTIL HFA;VENTOLIN HFA) 90 mcg/actuation inhaler, Inhale 2 puffs 4 (four) times a day as needed for wheezing., Disp: , Rfl:      cetirizine (ZYRTEC) 10 MG tablet, Take 10 mg by mouth daily., Disp: , Rfl:      cyclobenzaprine (FLEXERIL) 10 MG tablet, Take 10 mg by mouth 3 (three) times a day as needed for muscle spasms., Disp: , Rfl:      EPINEPHrine (EPIPEN) 0.3 mg/0.3 mL atIn, Inject 0.3 mg into the shoulder, thigh, or buttocks once., Disp: , Rfl:      ergocalciferol (VITAMIN  "D2) 50,000 unit capsule, Take 50,000 Units by mouth 2 (two) times a week., Disp: , Rfl:      fluticasone (VERAMYST) 27.5 mcg/actuation nasal spray, 2 sprays into each nostril daily., Disp: , Rfl:      montelukast (SINGULAIR) 10 mg tablet, Take 10 mg by mouth daily., Disp: , Rfl:      naltrexone (DEPADE) 50 mg tablet, Days 0-10: Half a tablet in the am. Days 11 and up: Half tablet in am and half tablet in pm. Do not mix with narcotics., Disp: 120 tablet, Rfl: 2     prochlorperazine (COMPAZINE) 10 MG tablet, Take 10 mg by mouth every 12 (twelve) hours as needed for nausea., Disp: , Rfl:      SUMAtriptan (IMITREX) 6 mg/0.5 mL Soln, Inject 6 mg under the skin once as needed., Disp: , Rfl:      topiramate (TOPAMAX) 50 MG tablet, Take 50 mg by mouth daily., Disp: , Rfl:      traZODone (DESYREL) 50 MG tablet, Take 1-2 tablets by mouth at bedtime., Disp: , Rfl:      venlafaxine (EFFEXOR-XR) 150 MG 24 hr capsule, Take 450 mg by mouth daily., Disp: , Rfl:     Allergies   Allergen Reactions     Diatrizoate Meglumine Anaphylaxis     IV Contrast Dye     Nitrofurantoin Anaphylaxis     Macrobid     Penicillins Anaphylaxis     Atenolol Other (See Comments)     Hypotension     Ciprofloxacin Other (See Comments)     \"becomes irritable\"     Clotrimazole Itching     Metrogel [Metronidazole] Other (See Comments)     Itching inside     Silver Nitrate Other (See Comments)     Causes skin infection     Sulfa (Sulfonamide Antibiotics) Unknown     Adhesive Tape-Silicones Itching and Rash       Social History     Social History     Marital status:      Spouse name: N/A     Number of children: N/A     Years of education: N/A     Occupational History     Not on file.     Social History Main Topics     Smoking status: Former Smoker     Packs/day: 0.50     Years: 25.00     Quit date: 12/12/2007     Smokeless tobacco: Never Used     Alcohol use Yes      Comment: Very rare glass of wine     Drug use: No     Sexual activity: Yes     Birth " control/ protection: Post-menopausal     Other Topics Concern     Not on file     Social History Narrative    .  No kids.  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       Family History   Problem Relation Age of Onset     Hypertension Mother      Also Vitamin B12 Defiency     Obesity Mother      Stroke Father      Also had pericarditis     Lung disease Father      No Medical Problems Brother      No Medical Problems Brother      Obesity Paternal Grandmother        Review of Systems:  Snow Huang no new numbess, tingling or weakness, redness or rashes, fevers, new masses, abdominal bloating or discomfort, unexplained weight loss, increased pain, new ulcers, shortness of breath and chest pain  Full 12 point review of systems was completed.    Imaging:    LEFT LOWER EXTREMITY VENOUS DUPLEX ULTRASOUND WITH INSUFFICIENCY STUDY  12/19/2016 2:26 PM     INDICATIONS: Left leg pain and swelling.     TECHNIQUE: Venous duplex ultrasound with gray-scale images, graded compression, and color-flow, Doppler, and spectral analysis. Abnormal reflux is defined as 500 msec for superficial veins and 1 sec for deep veins.  COMPARISONS: 3/22/2013.     FINDINGS: The left greater saphenous vein demonstrates abnormal reflux, only at the saphenofemoral junction. The vein otherwise appears competent, measuring between 7 mm in diameter at saphenofemoral junction and 3 mm in the distal calf. No abnormal   reflux is detected in the left lesser saphenous vein.     Abnormal reflux is detected in the upper segment of the left superficial femoral vein. No other significant deep venous reflux is detected, and no incompetent perforating veins are identified.     There is no evidence for deep vein thrombosis. There is normal flow and compressibility in the femoral, popliteal, and posterior tibial veins.     CONCLUSIONS:   1.  Abnormal reflux in the left greater saphenous vein, only at the saphenofemoral junction.  2.  No evidence for left  lower extremity deep venous thrombosis.    Labs:  Lab Results   Component Value Date    SEDRATE 10 03/05/2015         No results found for: CRP        Lab Results   Component Value Date    CREATININE 0.81 04/20/2017      No results found for: HGBA1C        Lab Results   Component Value Date    BUN 19 04/20/2017              Lab Results   Component Value Date    ALBUMIN 4.0 04/20/2017       Vitamin D, Total (25-Hydroxy)   Date Value Ref Range Status   09/15/2017 21.4 (L) 30.0 - 80.0 ng/mL Final       Lab Results   Component Value Date    TSH 0.95 04/20/2017     Lab Results   Component Value Date    WBC 4.1 04/20/2017    HGB 14.0 04/20/2017    HCT 43.9 04/20/2017    MCV 98 04/20/2017     04/20/2017       Physical Exam:  Vitals:    12/14/17 1051   BP: 116/80   Pulse: 80   Resp: 16   Temp: 98.6  F (37  C)      BMI 37.41    Circumferential measures:    Vasc Edema 12/12/2016 5/10/2017 12/14/2017   Right-just above MCP 19 - 19.5   Right Wrist 17 - 16.5   Right Up 10cm 26 - 22.7   Right Up 10cm From Elbow 39 - 34.4   Left-just above MCP 18.5 - 18.4   Left Wrist 17.2 - 16.9   Left Up 10cm 25 - 23.5   Left Up 10cm From Elbow 37.5 - 36.4   Right just above MTP 21.3 21.2 23.1   Right Ankle 24.3 23.8 26.2   Right Widest Calf 47.4 46.2 46.2   Right Thigh Up 10cm 59.5 - 49.9   Left - just above MTP 21.5 21.3 23.1   Left Ankle 25.4 24.8 26.7   Left Widest Calf 47.7 47.4 48.1   Left Thigh Up 10cm 62 - 57.1     Circumferential measures improving nicely with weight loss.     General:  57 y.o. female in no apparent distress.  Alert and oriented x 3.  Cooperative. Affect normal.    Musculoskeletal:  Normal range of motion in hips, knees and ankles bilaterally throughout .  Slight tightness in the ankles bilaterally.  Significant pain along palpation of the plantar fascia bilaterally.  There is no active joint synovitis, erythema, swelling or joint laxity.      Neurological:  Sensation is intact to pin prick and light touch in  both legs.  Strength testing is normal in hip flexion, knee flexion, knee extension, ankle dorsiflexion and great toe extension bilaterally.       Vascular: Dorsalis pedis and posterior tibialis pulses are strong and equal bilaterally. There are no significant telangietasias, medial ankle venous flares, venous varicosities  and spider veins. There is normal capillary refill.      Integumentary: Skin of the legs is uniformly warm and dry and with negative Stemmer's Sign.  There is significant fibrosis of the feet and toes.  Nails are normal.       Edna Pritchard MD, ABWMS, FACCWS, [facility]  Medical Director Wound Care and Lymphedema  [facility]  [phone]

## 2021-06-16 NOTE — PROGRESS NOTES
"Bariatric Clinic Follow-Up Visit:    Snow Huang is a 57 y.o.  female with Body mass index is 40.06 kg/(m^2).  presenting here today for follow-up on non-surgical efforts for weight loss. Original Intake visit occurred on 4/12/17 with a weight of 217lbs and BMI of 41.  Along with diet and behavior changes, she has been using naltrexone to assist her weight loss goals.  See her intake visit notes for details on identified contributors to weight gain in the past.    Weight:   Wt Readings from Last 2 Encounters:   03/23/18 212 lb (96.2 kg)   12/14/17 198 lb (89.8 kg)    pounds  Height: 5' 1\" (1.549 m) (3/23/2018 10:56 AM)  Initial Weight: 217 lbs (3/23/2018 10:56 AM)  Weight: 212 lb (96.2 kg) (3/23/2018 10:56 AM)  Weight loss from initial: 5 (3/23/2018 10:56 AM)  % Weight loss: 2.3 % (3/23/2018 10:56 AM)  BMI (Calculated): 40.1 (3/23/2018 10:56 AM)  SpO2: 99 % (3/23/2018 10:56 AM)  Heart Rate (/min): 92 (6/2/2017  1:02 PM)  BP (mmHg): 106/60 (3/23/2018 10:56 AM)  Waist Circumference (In): 40 Inches (8/16/2017 11:52 AM)  Hip Circumference (In): 47 Inches (8/16/2017 11:52 AM)  Neck Circumference (In): 14 Inches (8/16/2017 11:52 AM)    Comorbidities:  Patient Active Problem List   Diagnosis     Asthma     Malignant neoplasm of breast     Endometriosis     History of hysterectomy     Recurrent major depressive episodes     Migraine without status migrainosus, not intractable     Bilateral polycystic ovarian syndrome     Left leg swelling     Venous hypertension of both lower extremities     Left ankle tendonitis     Non morbid obesity due to excess calories     Venous insufficiency of left leg     Left leg pain     Plantar fasciitis, bilateral     Foot pain, bilateral     Claudication       Current Outpatient Prescriptions:      albuterol (PROVENTIL HFA;VENTOLIN HFA) 90 mcg/actuation inhaler, Inhale 2 puffs 4 (four) times a day as needed for wheezing., Disp: , Rfl:      cetirizine (ZYRTEC) 10 MG tablet, Take 10 " mg by mouth daily., Disp: , Rfl:      cyanocobalamin 1,000 mcg/mL injection, Inject 1,000 mcg into the shoulder, thigh, or buttocks every 30 (thirty) days., Disp: , Rfl:      cyclobenzaprine (FLEXERIL) 10 MG tablet, Take 10 mg by mouth 3 (three) times a day as needed for muscle spasms., Disp: , Rfl:      EPINEPHrine (EPIPEN) 0.3 mg/0.3 mL atIn, Inject 0.3 mg into the shoulder, thigh, or buttocks once., Disp: , Rfl:      ergocalciferol (VITAMIN D2) 50,000 unit capsule, Take 50,000 Units by mouth 2 (two) times a week., Disp: , Rfl:      fluticasone (VERAMYST) 27.5 mcg/actuation nasal spray, 2 sprays into each nostril daily., Disp: , Rfl:      montelukast (SINGULAIR) 10 mg tablet, Take 10 mg by mouth daily., Disp: , Rfl:      prochlorperazine (COMPAZINE) 10 MG tablet, Take 10 mg by mouth every 12 (twelve) hours as needed for nausea., Disp: , Rfl:      SUMAtriptan (IMITREX) 6 mg/0.5 mL Soln, Inject 6 mg under the skin once as needed., Disp: , Rfl:      traZODone (DESYREL) 50 MG tablet, Take 1-2 tablets by mouth at bedtime., Disp: , Rfl:      UNABLE TO FIND, Med Name:Eye drop, not sure of name. Use 1 drop in each eye once a night, Disp: , Rfl:      venlafaxine (EFFEXOR-XR) 150 MG 24 hr capsule, Take 300 mg by mouth daily. , Disp: , Rfl:      naltrexone (DEPADE) 50 mg tablet, Days 0-10: Half a tablet in the am. Days 11 and up: Half tablet in am and half tablet in pm. 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   Plan:   1.  Diet: will get back on track with diet/protein goals, referral to our dietician if she has a yearly visit covered with her plan (once last year was all that was covered).  2. Exercise: walking the dog, work and will start 2x a week weight  work ups.   3. Medication: restart naltrexone and topamax.  4.  Stress Reduction:  Doing better  5. Goals: 191 lbs is 10% weight loss goal for this season and is maintaining 5 lbs-9 lbs of weight loss from her first weight loss season.  6. Migraines, improving but will start topamax again for duel benefits.    1. Migraines  topiramate (TOPAMAX) 50 MG tablet    Amb Referral to Bariatric Dietician   2. Obesity, Class III, BMI 40-49.9 (morbid obesity)  naltrexone (DEPADE) 50 mg tablet    topiramate (TOPAMAX) 50 MG tablet    Amb Referral to Bariatric Dietician     Return in about 4 weeks (around 4/20/2018). Will evaluate efficacy and tolerance of restarted meds.     We discussed [facility] Bariatric Basics including:  -eating 3 meals daily  -eating protein first  -eating slowly, chewing food well  -avoiding/limiting calorie containing beverages  -We discussed the importance of restorative sleep and stress management in maintaining a healthy weight.  -We discussed the National Weight Control Registry healthy weight maintenance strategies and ways to optimize metabolism.  -We discussed the importance of physical activity including cardiovascular and strength training in maintaining a healthier weight and explored viable options.    Most recent labs:  Lab Results   Component Value Date    WBC 4.1 04/20/2017    HGB 14.0 04/20/2017    HCT 43.9 04/20/2017    MCV 98 04/20/2017     04/20/2017     Lab Results   Component Value Date    CHOL 250 (H) 04/20/2017     Lab Results   Component Value Date     04/20/2017     Lab Results   Component Value Date    LDLCALC 138 (H) 04/20/2017     Lab Results   Component Value Date    TRIG 62 04/20/2017     No components found for: CHOLHDL  Lab Results   Component Value Date    ALT 20 02/28/2018    AST 21 02/28/2018    ALKPHOS 110 02/28/2018    BILITOT 0.3 02/28/2018     No results found for: HGBA1C  Lab Results   Component Value Date    WQIETKYV83 319 04/20/2017     Lab Results  "  Component Value Date    IGLRSCLC43NH 21.4 (L) 09/15/2017     No results found for: FERRITIN  No results found for: PTH  Lab Results   Component Value Date    47497 114 04/20/2017     No results found for: 7597  Lab Results   Component Value Date    TSH 1.12 02/28/2018     Lab Results   Component Value Date    TESTOSTERONE 70 (H) 03/05/2015       DIETARY HISTORY    Positive Changes Since Last Visit: none  Struggling With: treats, over consumption, lack of exercise.     Knowledgeable in Reading Food Labels: in the past did well.   Getting Adequate Protein: no  Sleeping 7-8 hours/day often  Stress management improving, has a new puppy which is both good and stressful at the same time as they are just starting the training process    PHYSICAL ACTIVITY PATTERNS:  Cardiovascular: minimal this Winter.   Strength Training: none. Will restart with weights and walking her dog.    REVIEW OF SYSTEMS  GENERAL/CONSTITUTIONAL:  nl  HEENT:   has had braces put on her teeth since our last visit.   CARDIOVASCULAR:   nl  PULMONARY:   nl  GASTROINTESTINAL:  nl  UROLOGIC:  nl  NEUROLOGIC:  Dealing w/ fading migraine the last week.  PSYCHIATRIC:  Stable, is troubled by her weight regain but understands why it happened  MUSCULOSKELETAL/RHEUMATOLOGIC   neck issues contribute to her migraines.  ENDOCRINE:  na  DERMATOLOGIC:  na    PHYSICAL EXAM:  Vitals: /60 (Patient Site: Left Arm, Patient Position: Sitting, Cuff Size: Adult Large)  Pulse 97  Ht 5' 1\" (1.549 m)  Wt 212 lb (96.2 kg)  SpO2 99%  Breastfeeding? No  BMI 40.06 kg/m2  Height: 5' 1\" (1.549 m) (3/23/2018 10:56 AM)  Initial Weight: 217 lbs (3/23/2018 10:56 AM)  Weight: 212 lb (96.2 kg) (3/23/2018 10:56 AM)  Weight loss from initial: 5 (3/23/2018 10:56 AM)  % Weight loss: 2.3 % (3/23/2018 10:56 AM)  BMI (Calculated): 40.1 (3/23/2018 10:56 AM)  SpO2: 99 % (3/23/2018 10:56 AM)  Heart Rate (/min): 92 (6/2/2017  1:02 PM)  BP (mmHg): 106/60 (3/23/2018 10:56 AM)  Waist " Circumference (In): 40 Inches (8/16/2017 11:52 AM)  Hip Circumference (In): 47 Inches (8/16/2017 11:52 AM)  Neck Circumference (In): 14 Inches (8/16/2017 11:52 AM)    GEN: Pleasant, well groomed, in no acute distress  HEENT: PEERL, EOMI, airway clear. Braces on teeth now.  .  NECK: No swelling.  HEART: Rhythm regular, rate regular, no murmur   LUNGS: Clear without crackles or wheezes. No cough.  ABDOMEN: obese..  EXTREMITIES: 2 plus palpable peripheral pulses, no tremor.  NEURO: Alert and Oriented X3, normal gait and speech.  SKIN: No visible rashes..        25 minutes was spent in direct consultation, with over 50% of it spent in counseling regarding their plan for excess weight reduction and health modification.  Andres Montez MD  [facility]  10:57 AM

## 2021-09-24 ENCOUNTER — LAB REQUISITION (OUTPATIENT)
Dept: LAB | Facility: CLINIC | Age: 61
End: 2021-09-24
Payer: MEDICARE

## 2021-09-24 DIAGNOSIS — R53.83 OTHER FATIGUE: ICD-10-CM

## 2021-09-24 LAB
ERYTHROCYTE [DISTWIDTH] IN BLOOD BY AUTOMATED COUNT: 14.6 % (ref 10–15)
HCT VFR BLD AUTO: 41.1 % (ref 35–47)
HGB BLD-MCNC: 13 G/DL (ref 11.7–15.7)
MCH RBC QN AUTO: 31.1 PG (ref 26.5–33)
MCHC RBC AUTO-ENTMCNC: 31.6 G/DL (ref 31.5–36.5)
MCV RBC AUTO: 98 FL (ref 78–100)
PLATELET # BLD AUTO: 232 10E3/UL (ref 150–450)
RBC # BLD AUTO: 4.18 10E6/UL (ref 3.8–5.2)
TSH SERPL DL<=0.005 MIU/L-ACNC: 1.36 UIU/ML (ref 0.3–5)
VIT B12 SERPL-MCNC: 280 PG/ML (ref 213–816)
WBC # BLD AUTO: 3.5 10E3/UL (ref 4–11)

## 2021-09-24 PROCEDURE — 84443 ASSAY THYROID STIM HORMONE: CPT | Mod: ORL | Performed by: FAMILY MEDICINE

## 2021-09-24 PROCEDURE — 36415 COLL VENOUS BLD VENIPUNCTURE: CPT | Mod: ORL | Performed by: FAMILY MEDICINE

## 2021-09-24 PROCEDURE — 82306 VITAMIN D 25 HYDROXY: CPT | Mod: ORL | Performed by: FAMILY MEDICINE

## 2021-09-24 PROCEDURE — 82607 VITAMIN B-12: CPT | Mod: ORL | Performed by: FAMILY MEDICINE

## 2021-09-24 PROCEDURE — 85027 COMPLETE CBC AUTOMATED: CPT | Mod: ORL | Performed by: FAMILY MEDICINE

## 2021-09-27 LAB — DEPRECATED CALCIDIOL+CALCIFEROL SERPL-MC: 24 UG/L (ref 30–80)

## 2021-10-23 ENCOUNTER — HEALTH MAINTENANCE LETTER (OUTPATIENT)
Age: 61
End: 2021-10-23

## 2021-11-10 DIAGNOSIS — G43.709 CHRONIC MIGRAINE WITHOUT AURA: ICD-10-CM

## 2021-11-10 RX ORDER — SUMATRIPTAN 50 MG/1
TABLET, FILM COATED ORAL
Qty: 12 TABLET | Refills: 0 | Status: SHIPPED | OUTPATIENT
Start: 2021-11-10 | End: 2021-12-10

## 2021-11-10 NOTE — TELEPHONE ENCOUNTER
Rx Authorization:  Requested Medication/ Dose SUMAtriptan (IMITREX) 50 MG tablet  Date last refill ordered: 11/11/20  Quantity ordered: 12 tablets  # refills: 6  Date of last clinic visit with ordering provider: 2/21/20  Date of next clinic visit with ordering provider:   All pertinent protocol data (lab date/result): Include pertinent information from patients message:

## 2021-11-18 ENCOUNTER — LAB REQUISITION (OUTPATIENT)
Dept: LAB | Facility: CLINIC | Age: 61
End: 2021-11-18
Payer: MEDICARE

## 2021-11-18 DIAGNOSIS — Z03.818 ENCOUNTER FOR OBSERVATION FOR SUSPECTED EXPOSURE TO OTHER BIOLOGICAL AGENTS RULED OUT: ICD-10-CM

## 2021-11-18 PROCEDURE — 87081 CULTURE SCREEN ONLY: CPT | Mod: ORL | Performed by: PHYSICIAN ASSISTANT

## 2021-11-21 LAB — BACTERIA SPEC CULT: NORMAL

## 2021-12-10 DIAGNOSIS — G43.709 CHRONIC MIGRAINE WITHOUT AURA: ICD-10-CM

## 2021-12-10 RX ORDER — SUMATRIPTAN 50 MG/1
TABLET, FILM COATED ORAL
Qty: 9 TABLET | Refills: 0 | Status: SHIPPED | OUTPATIENT
Start: 2021-12-10 | End: 2022-01-11

## 2021-12-10 NOTE — TELEPHONE ENCOUNTER
Rx Authorization:  Requested Medication/ Dose: Sumatriptan 50MG tabs  Date last refill ordered: 11/10/21  Quantity ordered: 12tabs  # refills: 1  Date of last clinic visit with ordering provider: 2/21/20  Date of next clinic visit with ordering provider: F/U 3 months  All pertinent protocol data (lab date/result):   Include pertinent information from patients message:

## 2021-12-16 ENCOUNTER — LAB REQUISITION (OUTPATIENT)
Dept: LAB | Facility: CLINIC | Age: 61
End: 2021-12-16
Payer: MEDICARE

## 2021-12-16 DIAGNOSIS — R42 DIZZINESS AND GIDDINESS: ICD-10-CM

## 2021-12-16 LAB
ALBUMIN SERPL-MCNC: 3.9 G/DL (ref 3.5–5)
ALP SERPL-CCNC: 110 U/L (ref 45–120)
ALT SERPL W P-5'-P-CCNC: 30 U/L (ref 0–45)
ANION GAP SERPL CALCULATED.3IONS-SCNC: 9 MMOL/L (ref 5–18)
AST SERPL W P-5'-P-CCNC: 26 U/L (ref 0–40)
BILIRUB SERPL-MCNC: 0.4 MG/DL (ref 0–1)
BUN SERPL-MCNC: 13 MG/DL (ref 8–22)
CALCIUM SERPL-MCNC: 9.3 MG/DL (ref 8.5–10.5)
CHLORIDE BLD-SCNC: 104 MMOL/L (ref 98–107)
CO2 SERPL-SCNC: 26 MMOL/L (ref 22–31)
CREAT SERPL-MCNC: 0.64 MG/DL (ref 0.6–1.1)
ERYTHROCYTE [DISTWIDTH] IN BLOOD BY AUTOMATED COUNT: 14.1 % (ref 10–15)
GFR SERPL CREATININE-BSD FRML MDRD: >90 ML/MIN/1.73M2
GLUCOSE BLD-MCNC: 88 MG/DL (ref 70–125)
HCT VFR BLD AUTO: 43.4 % (ref 35–47)
HGB BLD-MCNC: 14 G/DL (ref 11.7–15.7)
MCH RBC QN AUTO: 31 PG (ref 26.5–33)
MCHC RBC AUTO-ENTMCNC: 32.3 G/DL (ref 31.5–36.5)
MCV RBC AUTO: 96 FL (ref 78–100)
PLATELET # BLD AUTO: 236 10E3/UL (ref 150–450)
POTASSIUM BLD-SCNC: 4.8 MMOL/L (ref 3.5–5)
PROT SERPL-MCNC: 6.4 G/DL (ref 6–8)
RBC # BLD AUTO: 4.52 10E6/UL (ref 3.8–5.2)
SODIUM SERPL-SCNC: 139 MMOL/L (ref 136–145)
TSH SERPL DL<=0.005 MIU/L-ACNC: 2.05 UIU/ML (ref 0.3–5)
WBC # BLD AUTO: 5.1 10E3/UL (ref 4–11)

## 2021-12-16 PROCEDURE — 85027 COMPLETE CBC AUTOMATED: CPT | Mod: ORL | Performed by: PHYSICIAN ASSISTANT

## 2021-12-16 PROCEDURE — 80053 COMPREHEN METABOLIC PANEL: CPT | Mod: ORL | Performed by: PHYSICIAN ASSISTANT

## 2021-12-16 PROCEDURE — 84443 ASSAY THYROID STIM HORMONE: CPT | Mod: ORL | Performed by: PHYSICIAN ASSISTANT

## 2022-01-09 DIAGNOSIS — G43.709 CHRONIC MIGRAINE WITHOUT AURA: ICD-10-CM

## 2022-01-10 NOTE — TELEPHONE ENCOUNTER
Rx Authorization:  Requested Medication/ Dose SUMATRIPTAN SUCC 50 MG TABLET  Date last refill ordered: 12/10/21  Quantity ordered: 9 tabs  # refills: 0  Date of last clinic visit with ordering provider: 2/21/20  Date of next clinic visit with ordering provider:   All pertinent protocol data (lab date/result):   Include pertinent information from patients message:

## 2022-01-11 RX ORDER — SUMATRIPTAN 50 MG/1
TABLET, FILM COATED ORAL
Qty: 9 TABLET | Refills: 0 | Status: SHIPPED | OUTPATIENT
Start: 2022-01-11 | End: 2022-05-10

## 2022-01-12 ENCOUNTER — CARE COORDINATION (OUTPATIENT)
Dept: NEUROLOGY | Facility: CLINIC | Age: 62
End: 2022-01-12
Payer: MEDICARE

## 2022-01-12 NOTE — PROGRESS NOTES
Fredrick Howard Kate, RN  Pt stated she doesn't need an appt right now & hung up.             Previous Messages       ----- Message -----   From: María Montes RN   Sent: 1/12/2022   8:26 AM CST   To: [id number]     Please help pt schedule a follow up with Francesca to review headache treatment plan. She will need a follow up to receive continued medication refills. Follow up can be virtual or in person.     Thanks     María ELLISON

## 2022-01-26 DIAGNOSIS — G43.709 CHRONIC MIGRAINE WITHOUT AURA: ICD-10-CM

## 2022-01-26 RX ORDER — SUMATRIPTAN 50 MG/1
TABLET, FILM COATED ORAL
Qty: 9 TABLET | Refills: 0 | OUTPATIENT
Start: 2022-01-26

## 2022-03-22 ENCOUNTER — OFFICE VISIT (OUTPATIENT)
Dept: PHYSICAL MEDICINE AND REHAB | Facility: CLINIC | Age: 62
End: 2022-03-22
Payer: MEDICARE

## 2022-03-22 VITALS
RESPIRATION RATE: 16 BRPM | OXYGEN SATURATION: 98 % | SYSTOLIC BLOOD PRESSURE: 130 MMHG | TEMPERATURE: 98.4 F | BODY MASS INDEX: 43.85 KG/M2 | HEART RATE: 96 BPM | WEIGHT: 232.1 LBS | DIASTOLIC BLOOD PRESSURE: 82 MMHG

## 2022-03-22 DIAGNOSIS — M79.18 MYOFASCIAL PAIN: Primary | ICD-10-CM

## 2022-03-22 PROCEDURE — 96372 THER/PROPH/DIAG INJ SC/IM: CPT | Performed by: PHYSICAL MEDICINE & REHABILITATION

## 2022-03-22 PROCEDURE — 20553 NJX 1/MLT TRIGGER POINTS 3/>: CPT | Mod: 59 | Performed by: PHYSICAL MEDICINE & REHABILITATION

## 2022-03-22 RX ORDER — BUPIVACAINE HYDROCHLORIDE 2.5 MG/ML
5 INJECTION, SOLUTION EPIDURAL; INFILTRATION; INTRACAUDAL ONCE
Status: COMPLETED | OUTPATIENT
Start: 2022-03-22 | End: 2022-03-22

## 2022-03-22 RX ORDER — TRIAMCINOLONE ACETONIDE 40 MG/ML
20 INJECTION, SUSPENSION INTRA-ARTICULAR; INTRAMUSCULAR ONCE
Status: COMPLETED | OUTPATIENT
Start: 2022-03-22 | End: 2022-03-22

## 2022-03-22 RX ADMIN — BUPIVACAINE HYDROCHLORIDE 12.5 MG: 2.5 INJECTION, SOLUTION EPIDURAL; INFILTRATION; INTRACAUDAL at 17:27

## 2022-03-22 RX ADMIN — TRIAMCINOLONE ACETONIDE 20 MG: 40 INJECTION, SUSPENSION INTRA-ARTICULAR; INTRAMUSCULAR at 17:27

## 2022-03-22 ASSESSMENT — PAIN SCALES - GENERAL: PAINLEVEL: NO PAIN (0)

## 2022-03-22 NOTE — LETTER
3/22/2022       RE: Snow Hawthorne  [address] [place] [id number]     Dear Colleague,    Thank you for referring your patient, Snow Hawthorne, to the [facility] PHYSICAL MEDICINE AND REHABILITATION CLINIC [place] [facility]. Please see a copy of my visit note below.    [facility]  [facility]  Physical Medicine & Rehabilitation Consultation    Snow Hawthorne   YOB: 1960  MRN: [id number]   Date of Service: 3/22/2022      History of Present Illness:  Snow Hawthorne is a 61 year old old female with a history of chronic migraine headaches who is referred to clinic for repeat trigger point injections for management of myofascial pain in her neck and shoulder muscles. Patient feels that her migraines stem from excessive muscle tension/tightness in her neck and shoulder muscles. At the last injection, her left shoulder muscles are always more tight and painful than her right side. Today her pain is more mid-line.    Today, The patient most recently underwent trigger point injections on 2020, and this provided excellent relief of her muscle pain for months. The effect of the injection did last about 3-4 months. She reports headache 4-5 days a week. She has sumatriptan to use for the migraine. She does not have preventive medication. She had Botox injections done with [facility]. She reports that she had flu like symptoms after the injection for about a week. She is not getting Botox injections.     Physical Examination:    Pleasant and cooperative, in no acute distress  No lesions or abrasions on exposed skin  Myofascial trigger points noted in bilateral upper trapezius muscles, L>R      Procedure:     Drug:   Bupivacaine 0.25%: 4.5 ml (Lot: [id number], Exp: 2024, NDC: 99773-326-59)  Kenalo mg = 0.5 ml (Lot: [id number], Exp: 10/2023, NDC:  [address]-9)    Prior to the start of the procedure and with procedural staff participation, I verbally confirmed the patient s identity using two indicators, relevant allergies, that the procedure was appropriate and matched the consent or emergent situation, and that the correct equipment/implants were available. Immediately prior to starting the procedure I conducted the Time Out with the procedural staff and re-confirmed the patient s name, procedure, and site/side. (The Joint Commission universal protocol was followed.)  Yes    Sedation (Moderate or Deep): None      Trigger Point Injections    Areas were prepped with ChloraPrep.  Using standard precautions a 25 mm EMG needle with EMG guidance was used to inject a 5 ml mixture of 0.25% bupivacaine and kenalog into the left trapezius muscles for a total of 12 site/s, left splenius for a total of 2 site/s, left levator scapula for a total of 2 site/s and the right trapezius muscles for a total of 10 site/s.  Patient tolerated the procedure well and there were no complications.      Snow Hawthorne tolerated the procedure well without any immediate complications. She was allowed to recover for an appropriate period of time and was discharged home in stable condition.  Patient will follow-up regarding response to this procedure.      Assessment & Recommendations:  Snow Hawthorne is a 61 year old old female who presents to rehabilitation clinic for trigger point injections for management of myofascial pain in her neck and shoulder girdle musculature.  Snow Hawthorne tolerated the procedure well and noted reduction in baseline pain following the procedure. We did discuss at today's visit that if the patient experiences resolution or improvement in pain that lasts > 1 month, we may repeat these injections no sooner than 3 months from today.       Lakisha Luque MD       Again, thank you for allowing me to participate in the care of your  patient.      Sincerely,    Lakisha Luque MD

## 2022-03-22 NOTE — PROGRESS NOTES
[facility] [place]  Physical Medicine & Rehabilitation Consultation    Snow Hawthorne   YOB: 1960  MRN: [id number]   Date of Service: 3/22/2022      History of Present Illness:  Snow Hawthorne is a 61 year old old female with a history of chronic migraine headaches who is referred to clinic for repeat trigger point injections for management of myofascial pain in her neck and shoulder muscles. Patient feels that her migraines stem from excessive muscle tension/tightness in her neck and shoulder muscles. At the last injection, her left shoulder muscles are always more tight and painful than her right side. Today her pain is more mid-line.    Today, The patient most recently underwent trigger point injections on 2020, and this provided excellent relief of her muscle pain for months. The effect of the injection did last about 3-4 months. She reports headache 4-5 days a week. She has sumatriptan to use for the migraine. She does not have preventive medication. She had Botox injections done with [facility]. She reports that she had flu like symptoms after the injection for about a week. She is not getting Botox injections.     Physical Examination:    Pleasant and cooperative, in no acute distress  No lesions or abrasions on exposed skin  Myofascial trigger points noted in bilateral upper trapezius muscles, L>R      Procedure:     Drug:   Bupivacaine 0.25%: 4.5 ml (Lot: [id number], Exp: 2024, NDC: 12254-736-43)  Kenalo mg = 0.5 ml (Lot: [id number], Exp: 10/2023, NDC: [address]-8)    Prior to the start of the procedure and with procedural staff participation, I verbally confirmed the patient s identity using two indicators, relevant allergies, that the procedure was appropriate and matched the consent or emergent situation, and that the correct equipment/implants were available. Immediately prior to starting the procedure I conducted  the Time Out with the procedural staff and re-confirmed the patient s name, procedure, and site/side. (The Joint Commission universal protocol was followed.)  Yes    Sedation (Moderate or Deep): None      Trigger Point Injections    Areas were prepped with ChloraPrep.  Using standard precautions a 25 mm EMG needle with EMG guidance was used to inject a 5 ml mixture of 0.25% bupivacaine and kenalog into the left trapezius muscles for a total of 12 site/s, left splenius for a total of 2 site/s, left levator scapula for a total of 2 site/s and the right trapezius muscles for a total of 10 site/s.  Patient tolerated the procedure well and there were no complications.      Snow Hawthorne tolerated the procedure well without any immediate complications. She was allowed to recover for an appropriate period of time and was discharged home in stable condition.  Patient will follow-up regarding response to this procedure.      Assessment & Recommendations:  Snow Hawthorne is a 61 year old old female who presents to rehabilitation clinic for trigger point injections for management of myofascial pain in her neck and shoulder girdle musculature.  Snow Hawthorne tolerated the procedure well and noted reduction in baseline pain following the procedure. We did discuss at today's visit that if the patient experiences resolution or improvement in pain that lasts > 1 month, we may repeat these injections no sooner than 3 months from today.       Lakisha Luque MD

## 2022-03-22 NOTE — LETTER
Date:March 22, 2022      Patient was self referred, no letter generated. Do not send.         Health Information

## 2022-05-09 DIAGNOSIS — G43.709 CHRONIC MIGRAINE WITHOUT AURA: ICD-10-CM

## 2022-05-09 NOTE — TELEPHONE ENCOUNTER
Rx Authorization:  Requested Medication/ Dose: Sumatriptan SUCC 50MG tabs  Date last refill ordered: 1/11/22  Quantity ordered: 9 tabs  # refills:   Date of last clinic visit with ordering provider: 2/21/20  Date of next clinic visit with ordering provider: F/U 3 months  All pertinent protocol data (lab date/result):   Include pertinent information from patients message:

## 2022-05-10 RX ORDER — SUMATRIPTAN 50 MG/1
TABLET, FILM COATED ORAL
Qty: 9 TABLET | Refills: 0 | Status: SHIPPED | OUTPATIENT
Start: 2022-05-10 | End: 2022-11-22

## 2022-06-04 ENCOUNTER — HEALTH MAINTENANCE LETTER (OUTPATIENT)
Age: 62
End: 2022-06-04

## 2022-06-07 DIAGNOSIS — G43.709 CHRONIC MIGRAINE WITHOUT AURA: ICD-10-CM

## 2022-06-07 RX ORDER — SUMATRIPTAN 50 MG/1
TABLET, FILM COATED ORAL
Qty: 9 TABLET | Refills: 0 | OUTPATIENT
Start: 2022-06-07

## 2022-10-09 ENCOUNTER — HEALTH MAINTENANCE LETTER (OUTPATIENT)
Age: 62
End: 2022-10-09

## 2022-10-12 ENCOUNTER — LAB REQUISITION (OUTPATIENT)
Dept: LAB | Facility: CLINIC | Age: 62
End: 2022-10-12
Payer: MEDICARE

## 2022-10-12 DIAGNOSIS — Z13.220 ENCOUNTER FOR SCREENING FOR LIPOID DISORDERS: ICD-10-CM

## 2022-10-12 DIAGNOSIS — E53.8 DEFICIENCY OF OTHER SPECIFIED B GROUP VITAMINS: ICD-10-CM

## 2022-10-12 DIAGNOSIS — E55.9 VITAMIN D DEFICIENCY, UNSPECIFIED: ICD-10-CM

## 2022-10-12 DIAGNOSIS — Z13.1 ENCOUNTER FOR SCREENING FOR DIABETES MELLITUS: ICD-10-CM

## 2022-10-12 LAB
ALBUMIN SERPL BCG-MCNC: 4 G/DL (ref 3.5–5.2)
ALP SERPL-CCNC: 118 U/L (ref 35–104)
ALT SERPL W P-5'-P-CCNC: 23 U/L (ref 10–35)
ANION GAP SERPL CALCULATED.3IONS-SCNC: 10 MMOL/L (ref 7–15)
AST SERPL W P-5'-P-CCNC: 23 U/L (ref 10–35)
BILIRUB SERPL-MCNC: 0.3 MG/DL
BUN SERPL-MCNC: 11.2 MG/DL (ref 8–23)
CALCIUM SERPL-MCNC: 8.9 MG/DL (ref 8.8–10.2)
CHLORIDE SERPL-SCNC: 102 MMOL/L (ref 98–107)
CHOLEST SERPL-MCNC: 239 MG/DL
CREAT SERPL-MCNC: 0.65 MG/DL (ref 0.51–0.95)
DEPRECATED HCO3 PLAS-SCNC: 28 MMOL/L (ref 22–29)
GFR SERPL CREATININE-BSD FRML MDRD: >90 ML/MIN/1.73M2
GLUCOSE SERPL-MCNC: 96 MG/DL (ref 70–99)
HDLC SERPL-MCNC: 78 MG/DL
LDLC SERPL CALC-MCNC: 136 MG/DL
NONHDLC SERPL-MCNC: 161 MG/DL
POTASSIUM SERPL-SCNC: 4.9 MMOL/L (ref 3.4–5.3)
PROT SERPL-MCNC: 5.8 G/DL (ref 6.4–8.3)
SODIUM SERPL-SCNC: 140 MMOL/L (ref 136–145)
TRIGL SERPL-MCNC: 123 MG/DL
TSH SERPL DL<=0.005 MIU/L-ACNC: 1.57 UIU/ML (ref 0.3–4.2)
VIT B12 SERPL-MCNC: 371 PG/ML (ref 232–1245)

## 2022-10-12 PROCEDURE — 82607 VITAMIN B-12: CPT | Mod: ORL | Performed by: PHYSICIAN ASSISTANT

## 2022-10-12 PROCEDURE — 82306 VITAMIN D 25 HYDROXY: CPT | Mod: ORL | Performed by: PHYSICIAN ASSISTANT

## 2022-10-12 PROCEDURE — 80053 COMPREHEN METABOLIC PANEL: CPT | Mod: ORL | Performed by: PHYSICIAN ASSISTANT

## 2022-10-12 PROCEDURE — 84443 ASSAY THYROID STIM HORMONE: CPT | Mod: ORL | Performed by: PHYSICIAN ASSISTANT

## 2022-10-12 PROCEDURE — 80061 LIPID PANEL: CPT | Mod: ORL | Performed by: PHYSICIAN ASSISTANT

## 2022-10-13 LAB — DEPRECATED CALCIDIOL+CALCIFEROL SERPL-MC: 27 UG/L (ref 20–75)

## 2022-11-18 DIAGNOSIS — G43.709 CHRONIC MIGRAINE WITHOUT AURA: ICD-10-CM

## 2022-11-22 RX ORDER — SUMATRIPTAN 50 MG/1
TABLET, FILM COATED ORAL
Qty: 9 TABLET | Refills: 0 | Status: SHIPPED | OUTPATIENT
Start: 2022-11-22 | End: 2022-12-22

## 2022-12-22 DIAGNOSIS — G43.709 CHRONIC MIGRAINE WITHOUT AURA: ICD-10-CM

## 2022-12-22 RX ORDER — SUMATRIPTAN 50 MG/1
TABLET, FILM COATED ORAL
Qty: 9 TABLET | Refills: 0 | Status: SHIPPED | OUTPATIENT
Start: 2022-12-22 | End: 2023-01-27

## 2023-01-27 DIAGNOSIS — G43.709 CHRONIC MIGRAINE WITHOUT AURA: ICD-10-CM

## 2023-01-27 RX ORDER — SUMATRIPTAN 50 MG/1
TABLET, FILM COATED ORAL
Qty: 9 TABLET | Refills: 0 | Status: SHIPPED | OUTPATIENT
Start: 2023-01-27 | End: 2023-02-22

## 2023-02-20 DIAGNOSIS — G43.709 CHRONIC MIGRAINE WITHOUT AURA: ICD-10-CM

## 2023-02-22 RX ORDER — SUMATRIPTAN 50 MG/1
TABLET, FILM COATED ORAL
Qty: 9 TABLET | Refills: 0 | Status: SHIPPED | OUTPATIENT
Start: 2023-02-22

## 2023-02-23 ENCOUNTER — TELEPHONE (OUTPATIENT)
Dept: NEUROLOGY | Facility: CLINIC | Age: 63
End: 2023-02-23
Payer: MEDICARE

## 2023-02-23 NOTE — TELEPHONE ENCOUNTER
Called pt and informed her if she would like Francesca to refill her script then she needs to schedule a follow up appt and pt verbally understood.  She stated she told pharmacist not to send the refill request to Francesca. Informed pt if she would like to follow up with Francesca then please contact our scheduling team and she verbally understood.

## 2023-04-12 ENCOUNTER — LAB REQUISITION (OUTPATIENT)
Dept: LAB | Facility: CLINIC | Age: 63
End: 2023-04-12
Payer: MEDICARE

## 2023-04-12 DIAGNOSIS — E53.8 DEFICIENCY OF OTHER SPECIFIED B GROUP VITAMINS: ICD-10-CM

## 2023-04-12 LAB — VIT B12 SERPL-MCNC: 340 PG/ML (ref 232–1245)

## 2023-04-12 PROCEDURE — 82607 VITAMIN B-12: CPT | Mod: ORL | Performed by: PHYSICIAN ASSISTANT

## 2023-04-12 PROCEDURE — 82306 VITAMIN D 25 HYDROXY: CPT | Mod: ORL | Performed by: PHYSICIAN ASSISTANT

## 2023-04-14 ENCOUNTER — LAB REQUISITION (OUTPATIENT)
Dept: LAB | Facility: CLINIC | Age: 63
End: 2023-04-14
Payer: MEDICARE

## 2023-04-14 DIAGNOSIS — E55.9 VITAMIN D DEFICIENCY, UNSPECIFIED: ICD-10-CM

## 2023-04-14 LAB — DEPRECATED CALCIDIOL+CALCIFEROL SERPL-MC: 28 UG/L (ref 20–75)

## 2023-05-12 ENCOUNTER — LAB REQUISITION (OUTPATIENT)
Dept: LAB | Facility: CLINIC | Age: 63
End: 2023-05-12
Payer: MEDICARE

## 2023-05-12 DIAGNOSIS — Z79.899 OTHER LONG TERM (CURRENT) DRUG THERAPY: ICD-10-CM

## 2023-05-12 LAB
ANION GAP SERPL CALCULATED.3IONS-SCNC: 10 MMOL/L (ref 7–15)
BUN SERPL-MCNC: 14.8 MG/DL (ref 8–23)
CALCIUM SERPL-MCNC: 8.9 MG/DL (ref 8.8–10.2)
CHLORIDE SERPL-SCNC: 106 MMOL/L (ref 98–107)
CREAT SERPL-MCNC: 0.63 MG/DL (ref 0.51–0.95)
DEPRECATED HCO3 PLAS-SCNC: 25 MMOL/L (ref 22–29)
GFR SERPL CREATININE-BSD FRML MDRD: >90 ML/MIN/1.73M2
GLUCOSE SERPL-MCNC: 99 MG/DL (ref 70–99)
POTASSIUM SERPL-SCNC: 4.7 MMOL/L (ref 3.4–5.3)
SODIUM SERPL-SCNC: 141 MMOL/L (ref 136–145)

## 2023-05-12 PROCEDURE — 80048 BASIC METABOLIC PNL TOTAL CA: CPT | Mod: ORL | Performed by: PHYSICIAN ASSISTANT

## 2023-06-15 ENCOUNTER — LAB REQUISITION (OUTPATIENT)
Dept: LAB | Facility: CLINIC | Age: 63
End: 2023-06-15
Payer: MEDICARE

## 2023-06-15 DIAGNOSIS — M79.89 OTHER SPECIFIED SOFT TISSUE DISORDERS: ICD-10-CM

## 2023-06-15 LAB
ALBUMIN SERPL BCG-MCNC: 4.3 G/DL (ref 3.5–5.2)
ALP SERPL-CCNC: 126 U/L (ref 35–104)
ALT SERPL W P-5'-P-CCNC: 24 U/L (ref 0–50)
ANION GAP SERPL CALCULATED.3IONS-SCNC: 10 MMOL/L (ref 7–15)
AST SERPL W P-5'-P-CCNC: 23 U/L (ref 0–45)
BILIRUB SERPL-MCNC: 0.3 MG/DL
BUN SERPL-MCNC: 11 MG/DL (ref 8–23)
CALCIUM SERPL-MCNC: 9.4 MG/DL (ref 8.8–10.2)
CHLORIDE SERPL-SCNC: 104 MMOL/L (ref 98–107)
CREAT SERPL-MCNC: 0.67 MG/DL (ref 0.51–0.95)
DEPRECATED HCO3 PLAS-SCNC: 24 MMOL/L (ref 22–29)
GFR SERPL CREATININE-BSD FRML MDRD: >90 ML/MIN/1.73M2
GLUCOSE SERPL-MCNC: 99 MG/DL (ref 70–99)
NT-PROBNP SERPL-MCNC: 87 PG/ML (ref 0–900)
POTASSIUM SERPL-SCNC: 4.8 MMOL/L (ref 3.4–5.3)
PROT SERPL-MCNC: 6.2 G/DL (ref 6.4–8.3)
SODIUM SERPL-SCNC: 138 MMOL/L (ref 136–145)

## 2023-06-15 PROCEDURE — 83880 ASSAY OF NATRIURETIC PEPTIDE: CPT | Mod: ORL | Performed by: PHYSICIAN ASSISTANT

## 2023-06-15 PROCEDURE — 80053 COMPREHEN METABOLIC PANEL: CPT | Mod: ORL | Performed by: PHYSICIAN ASSISTANT

## 2023-09-12 ENCOUNTER — PATIENT OUTREACH (OUTPATIENT)
Dept: CARE COORDINATION | Facility: CLINIC | Age: 63
End: 2023-09-12
Payer: MEDICARE

## 2023-09-12 ENCOUNTER — TELEPHONE (OUTPATIENT)
Dept: PHYSICAL MEDICINE AND REHAB | Facility: CLINIC | Age: 63
End: 2023-09-12

## 2023-09-12 NOTE — TELEPHONE ENCOUNTER
Health Call Center    Phone Message    May a detailed message be left on voicemail: yes     Reason for Call: Other: Patient called requesting to schedule Neurotoxin injection. Patient last seen on 3/22/2022 with Dr. Luque, writer unable to schedule patient within specified time from. Please review.     Action Taken: Message routed to:  [facility] ([facility]): PM&R    Travel Screening: Not Applicable

## 2023-09-26 ENCOUNTER — PATIENT OUTREACH (OUTPATIENT)
Dept: CARE COORDINATION | Facility: CLINIC | Age: 63
End: 2023-09-26
Payer: MEDICARE

## 2023-11-02 ENCOUNTER — OFFICE VISIT (OUTPATIENT)
Dept: PHYSICAL MEDICINE AND REHAB | Facility: CLINIC | Age: 63
End: 2023-11-02
Payer: MEDICARE

## 2023-11-02 VITALS — DIASTOLIC BLOOD PRESSURE: 77 MMHG | HEART RATE: 84 BPM | SYSTOLIC BLOOD PRESSURE: 131 MMHG

## 2023-11-02 DIAGNOSIS — M79.18 MYOFASCIAL PAIN: Primary | ICD-10-CM

## 2023-11-02 PROCEDURE — 20553 NJX 1/MLT TRIGGER POINTS 3/>: CPT | Performed by: PHYSICAL MEDICINE & REHABILITATION

## 2023-11-02 RX ORDER — PHENTERMINE HYDROCHLORIDE 37.5 MG/1
37.5 CAPSULE ORAL DAILY
COMMUNITY
Start: 2023-06-15 | End: 2024-09-13

## 2023-11-02 RX ORDER — FUROSEMIDE 20 MG
20-40 TABLET ORAL DAILY PRN
COMMUNITY
Start: 2023-09-19

## 2023-11-02 RX ORDER — BUTALBITAL, ACETAMINOPHEN, CAFFEINE AND CODEINE PHOSPHATE 50; 325; 40; 30 MG/1; MG/1; MG/1; MG/1
CAPSULE ORAL
COMMUNITY
Start: 2023-10-26 | End: 2024-03-13

## 2023-11-02 RX ORDER — TRAMADOL HYDROCHLORIDE 50 MG/1
TABLET ORAL
COMMUNITY
Start: 2023-10-11

## 2023-11-02 RX ORDER — SUMATRIPTAN 6 MG/.5ML
INJECTION, SOLUTION SUBCUTANEOUS
COMMUNITY
Start: 2023-10-10

## 2023-11-02 NOTE — PROGRESS NOTES
[facility]     PHYSICAL MEDICINE & REHABILITATION   PROCEDURE NOTE - TRIGGER POINT INJECTIONS        HPI:  Snow Hawthorne is a 63 year old female with a history of chronic neck pain and intractable migraine headaches who presents today for trigger point injections with the goal of minimizing her myofascial pain, which is likely etiology of her current presenting symptoms.     She was last seen in clinic on 3/22/2023 at which time she had trigger point injections into her neck and shoulder girdle musculature. This seemed to help ease her pain for at least 6 months. She was seen by a med spine provider at [facility], and she had an MRI of her cervical spine which revealed nerve compression. She had an injection under fluoroscopy a few weeks ago, and there is plan for them to repeat the injections in the thoracic level.     Typically, her pain is more significant in her upper trapezius and levator scapula muscles on the left side. She states that migraines have been quite bothersome for her lately. She will sometimes go months without a migraine and then she will have a 5 day migraine. In a good month, she may have no migraine headaches, and in a bad month, she may have 21 migraine headache days. This will go on for quite some time until she is started on a medrol dosepak which breaks the pain cycle. She has sumatriptan available as needed, but sometimes this either does not work or causes a rebound headache. She has tried tramadol, which works somewhat.    Lately, headaches have been worse, which is why she recently had the injections into her spine. She is currently being treated with antibiotics for a sinus infection, which is felt to be a possible contributor to her headaches. She is also trying different sleeping positions.        Current Outpatient Medications   Medication    butalbital-APAP-caffeine-codeine (FIORICET WITH CODEINE) -62-30 MG per capsule  "   cetirizine HCl 10 MG CAPS    COMPAZINE 10 MG OR TABS    diphenhydrAMINE (BENADRYL) 50 MG capsule    EFFEXOR 75 MG OR TABS    EPINEPHrine (EPIPEN/ADRENACLICK/OR ANY BX GENERIC EQUIV) 0.3 MG/0.3ML injection 2-pack    fluticasone (VERAMYST) 27.5 MCG/SPRAY nasal spray    furosemide (LASIX) 20 MG tablet    IMITREX STATDOSE 6 MG/0.5ML SC KIT    phentermine (ADIPEX-P) 37.5 MG capsule    SUMAtriptan (IMITREX) 50 MG tablet    SUMAtriptan (IMITREX) 6 MG/0.5ML injection    tiZANidine (ZANAFLEX) 4 MG tablet    traMADol (ULTRAM) 50 MG tablet    Vitamin D3 25 mcg (1000 units) PO tablet    Riboflavin-Magnesium-Feverfew (MIGRELIEF) 200-180-50 MG TABS     No current facility-administered medications for this visit.        Allergies   Allergen Reactions    Atenolol Anaphylaxis and Other (See Comments)     Hypotension      Diatrizoate Anaphylaxis     Other reaction(s): *Unknown  IV Contrast Dye      Nitrofurantoin Anaphylaxis     Macrobid      Beta Adrenergic Blockers      SYNCOPY    Ciprofloxacin Other (See Comments)     \"becomes irritable\"  \"becomes irritable\"      Clotrimazole Itching    Contrast Dye      IVP    Metronidazole Other (See Comments)     Itching inside    Penicillins     Silver Nitrate Other (See Comments)     Other reaction(s): Irritation At Inj Site  Causes skin infection      Sulfa Antibiotics     Adhesive Tape Itching and Rash       PHYSICAL EXAM:  VS: /77 (BP Location: Left arm, Patient Position: Sitting, Cuff Size: Adult Regular)   Pulse 84   LMP 10/12/2003    GEN: Patient is pleasant and cooperative  SKIN: No lesions or abrasions on exposed skin  HEENT: Numerous tight muscle bands palpated along bilateral upper trapezius, levator scapula, splenius muscles b/l  NECK: Upper trapezius hypertonicity b/l      PROCEDURE: Trigger Point injections.     Prior to the start of the procedure and with procedural staff participation, I verbally confirmed the patient s identity using two indicators, relevant " allergies, that the procedure was appropriate and matched the consent or emergent situation, and that the correct equipment/implants were available. Immediately prior to starting the procedure I conducted the Time Out with the procedural staff and re-confirmed the patient s name, procedure, and site/side. (The Joint Commission universal protocol was followed.)  Yes    Sedation (Moderate or Deep): None    Dru% lidocaine: 2 ml  0.5% bupivacaine: 6 ml     Areas were prepped with ChloraPrep.  Using standard precautions a 30-gauge 1-inch needle was used to inject a 8 ml mixture of the above medications into the upper trapezius, levator scapula, rhomboid major and splenius muscles bilaterally for a total of 10 sites.       Snow Hawthorne tolerated the procedure well without any immediate complications. she was allowed to recover for an appropriate period of time and was discharged home in stable condition.  Patient will follow-up regarding response to this procedure.      ASSESSMENT/PLAN:  Snow Hawthorne is a 63 year old female who presents to clinic for follow up regarding her chronic myofascial pain and for trigger point injections for management of this pain in the neck and shoulder girdle musculature.     Patient tolerated the procedure and noted reduction in pain following the procedure.     Follow up as needed.     Lakisha Luque MD

## 2023-11-02 NOTE — LETTER
11/2/2023         RE: Snow Hawthorne  [address] [place] [address] [address] [facility] [id number]        Dear Colleague,    Thank you for referring your patient, Snow Hawthorne, to the [facility]. Please see a copy of my visit note below.      [facility]     PHYSICAL MEDICINE & REHABILITATION   PROCEDURE NOTE - TRIGGER POINT INJECTIONS        HPI:  Snow Hawthorne is a 63 year old female with a history of chronic neck pain and intractable migraine headaches who presents today for trigger point injections with the goal of minimizing her myofascial pain, which is likely etiology of her current presenting symptoms.     She was last seen in clinic on 3/22/2023 at which time she had trigger point injections into her neck and shoulder girdle musculature. This seemed to help ease her pain for at least 6 months. She was seen by a med spine provider at [facility], and she had an MRI of her cervical spine which revealed nerve compression. She had an injection under fluoroscopy a few weeks ago, and there is plan for them to repeat the injections in the thoracic level.     Typically, her pain is more significant in her upper trapezius and levator scapula muscles on the left side. She states that migraines have been quite bothersome for her lately. She will sometimes go months without a migraine and then she will have a 5 day migraine. In a good month, she may have no migraine headaches, and in a bad month, she may have 21 migraine headache days. This will go on for quite some time until she is started on a medrol dosepak which breaks the pain cycle. She has sumatriptan available as needed, but sometimes this either does not work or causes a rebound headache. She has tried tramadol, which works somewhat.    Lately, headaches have been worse, which is why she recently had the injections into her spine. She is currently being treated with  "antibiotics for a sinus infection, which is felt to be a possible contributor to her headaches. She is also trying different sleeping positions.        Current Outpatient Medications   Medication     butalbital-APAP-caffeine-codeine (FIORICET WITH CODEINE) -48-30 MG per capsule     cetirizine HCl 10 MG CAPS     COMPAZINE 10 MG OR TABS     diphenhydrAMINE (BENADRYL) 50 MG capsule     EFFEXOR 75 MG OR TABS     EPINEPHrine (EPIPEN/ADRENACLICK/OR ANY BX GENERIC EQUIV) 0.3 MG/0.3ML injection 2-pack     fluticasone (VERAMYST) 27.5 MCG/SPRAY nasal spray     furosemide (LASIX) 20 MG tablet     IMITREX STATDOSE 6 MG/0.5ML SC KIT     phentermine (ADIPEX-P) 37.5 MG capsule     SUMAtriptan (IMITREX) 50 MG tablet     SUMAtriptan (IMITREX) 6 MG/0.5ML injection     tiZANidine (ZANAFLEX) 4 MG tablet     traMADol (ULTRAM) 50 MG tablet     Vitamin D3 25 mcg (1000 units) PO tablet     Riboflavin-Magnesium-Feverfew (MIGRELIEF) 200-180-50 MG TABS     No current facility-administered medications for this visit.        Allergies   Allergen Reactions     Atenolol Anaphylaxis and Other (See Comments)     Hypotension       Diatrizoate Anaphylaxis     Other reaction(s): *Unknown  IV Contrast Dye       Nitrofurantoin Anaphylaxis     Macrobid       Beta Adrenergic Blockers      SYNCOPY     Ciprofloxacin Other (See Comments)     \"becomes irritable\"  \"becomes irritable\"       Clotrimazole Itching     Contrast Dye      IVP     Metronidazole Other (See Comments)     Itching inside     Penicillins      Silver Nitrate Other (See Comments)     Other reaction(s): Irritation At Inj Site  Causes skin infection       Sulfa Antibiotics      Adhesive Tape Itching and Rash       PHYSICAL EXAM:  VS: /77 (BP Location: Left arm, Patient Position: Sitting, Cuff Size: Adult Regular)   Pulse 84   LMP 10/12/2003    GEN: Patient is pleasant and cooperative  SKIN: No lesions or abrasions on exposed skin  HEENT: Numerous tight muscle bands palpated along " bilateral upper trapezius, levator scapula, splenius muscles b/l  NECK: Upper trapezius hypertonicity b/l      PROCEDURE: Trigger Point injections.     Prior to the start of the procedure and with procedural staff participation, I verbally confirmed the patient s identity using two indicators, relevant allergies, that the procedure was appropriate and matched the consent or emergent situation, and that the correct equipment/implants were available. Immediately prior to starting the procedure I conducted the Time Out with the procedural staff and re-confirmed the patient s name, procedure, and site/side. (The Joint Commission universal protocol was followed.)  Yes    Sedation (Moderate or Deep): None    Dru% lidocaine: 2 ml  0.5% bupivacaine: 6 ml     Areas were prepped with ChloraPrep.  Using standard precautions a 30-gauge 1-inch needle was used to inject a 8 ml mixture of the above medications into the upper trapezius, levator scapula, rhomboid major and splenius muscles bilaterally for a total of 10 sites.       Snow Hawthorne tolerated the procedure well without any immediate complications. she was allowed to recover for an appropriate period of time and was discharged home in stable condition.  Patient will follow-up regarding response to this procedure.      ASSESSMENT/PLAN:  Snow Hawthorne is a 63 year old female who presents to clinic for follow up regarding her chronic myofascial pain and for trigger point injections for management of this pain in the neck and shoulder girdle musculature.     Patient tolerated the procedure and noted reduction in pain following the procedure.     Follow up as needed.     Lakisha Luque MD       Again, thank you for allowing me to participate in the care of your patient.        Sincerely,        Lakisha Luque MD

## 2023-11-02 NOTE — NURSING NOTE
Chief Complaint   Patient presents with    Consult For     Re-evaluate for trigger points  Saw [place] for neck injections

## 2023-11-14 RX ORDER — BUPIVACAINE HYDROCHLORIDE 5 MG/ML
6 INJECTION, SOLUTION PERINEURAL ONCE
Status: COMPLETED | OUTPATIENT
Start: 2023-11-14 | End: 2023-11-14

## 2023-11-14 RX ADMIN — BUPIVACAINE HYDROCHLORIDE 30 MG: 5 INJECTION, SOLUTION PERINEURAL at 13:40

## 2024-01-06 ENCOUNTER — HEALTH MAINTENANCE LETTER (OUTPATIENT)
Age: 64
End: 2024-01-06

## 2024-03-08 ENCOUNTER — VIRTUAL VISIT (OUTPATIENT)
Dept: PHARMACY | Facility: PHYSICIAN GROUP | Age: 64
End: 2024-03-08
Payer: MEDICAID

## 2024-03-08 DIAGNOSIS — E66.01 CLASS 3 SEVERE OBESITY WITH BODY MASS INDEX (BMI) OF 40.0 TO 44.9 IN ADULT, UNSPECIFIED OBESITY TYPE, UNSPECIFIED WHETHER SERIOUS COMORBIDITY PRESENT (H): Primary | ICD-10-CM

## 2024-03-08 DIAGNOSIS — E66.813 CLASS 3 SEVERE OBESITY WITH BODY MASS INDEX (BMI) OF 40.0 TO 44.9 IN ADULT, UNSPECIFIED OBESITY TYPE, UNSPECIFIED WHETHER SERIOUS COMORBIDITY PRESENT (H): Primary | ICD-10-CM

## 2024-03-08 DIAGNOSIS — G43.909 MIGRAINE WITHOUT STATUS MIGRAINOSUS, NOT INTRACTABLE, UNSPECIFIED MIGRAINE TYPE: ICD-10-CM

## 2024-03-08 DIAGNOSIS — J30.9 ALLERGIC RHINITIS, UNSPECIFIED SEASONALITY, UNSPECIFIED TRIGGER: ICD-10-CM

## 2024-03-08 PROCEDURE — 99207 PR NO CHARGE LOS: CPT | Mod: 93 | Performed by: PHARMACIST

## 2024-03-08 NOTE — PROGRESS NOTES
Medication Therapy Management (MTM) Encounter    ASSESSMENT:                            Medication Adherence/Access:   No issues identified    Obesity   Topiramate may help reduce food cravings in the evening. Reasonable to use this along with phentermine to help with weight loss. GLP-1 agonist would be more effective but are not covered by her insurance. Reviewed topiramate action, dosing, and possible side effects.     Migraine:   Stable. Topiramate may also have benefit for reducing migraine frequency.     Allergies:   Appropriate to use both steroid and antihistamine nasal spray. She may benefit from resuming use of steroid nasal spray given persistent symptoms.     PLAN:                            Start Topiramate 25 mg tablet.   Take 1 tablet (25 mg) by mouth evening before dinner for 1 week, then if tolerating increase to 2 tablets (50 mg) before dinner for 1 week, then if tolerating increase to 3 tablets (75 mg) before dinner thereafter. Stay at this dose until our next visit.     2. Okay to use both azelastine and fluticasone nasal sprays together    Follow-up: Return in 1 month (on 4/8/2024) for with me, using a phone visit at 10:30 AM.    Juany Jeronimo, PharmD, BCACP  Medication Therapy Management Pharmacist  [facility]  [phone]    SUBJECTIVE/OBJECTIVE:                          Snow Hawthorne is a 63 year old female called for an initial visit. She was referred to me from Dr. Orta.      Reason for visit: Discuss weight loss medication options.    Allergies/ADRs: Reviewed in chart  Past Medical History: Reviewed in chart  Tobacco: She reports that she has quit smoking. She has a 20 pack-year smoking history. She has never used smokeless tobacco.  Alcohol: not currently using      Medication Adherence/Access:   No issues reported    Obesity   Phentermine 37.5 mg once daily  She initially lost about 10 pounds with phentermine but has not seen much continued weight loss. She is  "interested in starting another medication. Patient reports trouble losing weight with diet changes and exercise.  Nutrition/Eating Habits:   3 meals a day  Evening snacking after dinner has been an issue  Trying to reduce total calories, eat better snacks  Drinks regular coke (1-2 cans per day), zero gatorade, sparking water, coffee  Exercise/Activity: walks.   Medication History:  -phentermine- currently on     Wt Readings from Last 4 Encounters:   03/22/22 232 lb 1.6 oz (105.3 kg)   10/25/19 227 lb (103 kg)   10/22/19 227 lb (103 kg)   03/23/18 212 lb (96.2 kg)     Estimated body mass index is 43.2 kg/m2 as calculated from the following:    Height as of this encounter: 5' 2.5\" (1.588 m).    Weight as of this encounter: 240 lb (108.9 kg).      Migraine:   Preventive medications  Venlafaxine  mg once daily  Acute medications  Sumatriptan 50 mg tabs and injection, not using much, prefers Excedrin first  Excedrin migraine 2 tablets as needed   Tramadol 50 mg- only if others don't work  Tizanidine 4 mg- neck pain  Ondansetron 8 mg   Current migraine frequency has been better, 4 in last month. She had Botox in the past but this did not go well. She thinks she may have been on topiramate to prevent migraines in the past but doesn't remember, does not think it was a bad experience.     Allergies:   Loratadine 10 mg once daily  Azelastine HCl 0.1 % Solution 1 puff in each nostril Nasally Twice a day- recently started  Fluticasone Propionate 50 MCG/ACT Suspension 1 spray(s) intranasally each nostril once a day  Benadryl Allergy 25 MG Tablet 1 tablet as needed for hives Orally Once a day  Albuterol Sulfate  (90 Base) MCG/ACT Aerosol Solution 1-2 puff(s) prn Inhalation every 4 hrs  Benzonatate 200 MG Capsule 1 capsule Orally Three times a day as needed cough  EpiPen 2-Hiren 0.3 MG/0.3ML Solution Auto-injector 0.3 mg intramuscularly once  Patient reports no current medication side effects.    Allergy symptoms have " been worse lately. She recently had a visit and was started on new nasal spray. She thought she was to stop using fluticasone when she started this. She doesn't think it has been working very well.      ----------------      I spent 39 minutes with this patient today. All changes were made via collaborative practice agreement with Carli Orta MD. A copy of the visit note was provided to the patient's provider(s).    A summary of these recommendations was sent via [employer].    Juany Jeronimo, PharmD, BCACP  Medication Therapy Management Pharmacist  [facility]  [phone]      Telemedicine Visit Details  Type of service:  Telephone visit  Start Time:  10:52 AM  End Time:  11:31 AM     Medication Therapy Recommendations  Class 3 severe obesity with body mass index (BMI) of 40.0 to 44.9 in adult, unspecified obesity type, unspecified whether serious comorbidity present (H)    Rationale: Synergistic therapy - Needs additional medication therapy - Indication   Recommendation: Start Medication - topiramate 25 MG tablet   Status: Accepted per CPA

## 2024-03-13 RX ORDER — TOPIRAMATE 25 MG/1
25 TABLET, FILM COATED ORAL DAILY
COMMUNITY
End: 2024-05-23

## 2024-03-13 NOTE — PATIENT INSTRUCTIONS
Recommendations from today's MTM visit:                                                       Start Topiramate 25 mg tablet.   Take 1 tablet (25 mg) by mouth evening before dinner for 1 week, then if tolerating increase to 2 tablets (50 mg) before dinner for 1 week, then if tolerating increase to 3 tablets (75 mg) before dinner thereafter. Stay at this dose until our next visit.     2. Okay to use both azelastine and fluticasone nasal sprays together    Follow-up: Return in 1 month (on 4/8/2024) for with me, using a phone visit at 10:30 AM.    It was great speaking with you today.  I value your experience and would be very thankful for your time in providing feedback in our clinic survey. In the next few days, you may receive an email or text message from [employer] with a link to a survey related to your clinical pharmacist.    To schedule another MTM appointment, please call [phone].    My Clinical Pharmacist's contact information:                                                      Please feel free to contact me with any questions or concerns you have.      Juany Jeronimo, PharmD, BCACP  Medication Therapy Management Pharmacist  [facility]  Voicemail: [phone]

## 2024-04-23 ENCOUNTER — LAB REQUISITION (OUTPATIENT)
Dept: LAB | Facility: CLINIC | Age: 64
End: 2024-04-23
Payer: MEDICARE

## 2024-04-23 DIAGNOSIS — E55.9 VITAMIN D DEFICIENCY, UNSPECIFIED: ICD-10-CM

## 2024-04-23 DIAGNOSIS — E53.8 DEFICIENCY OF OTHER SPECIFIED B GROUP VITAMINS: ICD-10-CM

## 2024-04-23 PROCEDURE — 82652 VIT D 1 25-DIHYDROXY: CPT | Mod: ORL | Performed by: PHYSICIAN ASSISTANT

## 2024-04-23 PROCEDURE — 82607 VITAMIN B-12: CPT | Mod: ORL | Performed by: PHYSICIAN ASSISTANT

## 2024-04-24 LAB
1,25(OH)2D SERPL-MCNC: 61.4 PG/ML (ref 19.9–79.3)
VIT B12 SERPL-MCNC: 432 PG/ML (ref 232–1245)

## 2024-05-09 ENCOUNTER — VIRTUAL VISIT (OUTPATIENT)
Dept: PHARMACY | Facility: PHYSICIAN GROUP | Age: 64
End: 2024-05-09
Payer: MEDICAID

## 2024-05-09 DIAGNOSIS — E66.813 CLASS 3 SEVERE OBESITY WITH BODY MASS INDEX (BMI) OF 40.0 TO 44.9 IN ADULT, UNSPECIFIED OBESITY TYPE, UNSPECIFIED WHETHER SERIOUS COMORBIDITY PRESENT (H): Primary | ICD-10-CM

## 2024-05-09 DIAGNOSIS — E66.01 CLASS 3 SEVERE OBESITY WITH BODY MASS INDEX (BMI) OF 40.0 TO 44.9 IN ADULT, UNSPECIFIED OBESITY TYPE, UNSPECIFIED WHETHER SERIOUS COMORBIDITY PRESENT (H): Primary | ICD-10-CM

## 2024-05-09 PROCEDURE — 99207 PR NO CHARGE LOS: CPT | Mod: 93 | Performed by: PHARMACIST

## 2024-05-09 NOTE — PROGRESS NOTES
Medication Therapy Management (MTM) Encounter    ASSESSMENT:                            Medication Adherence/Access: No issues identified  _  Obesity   Topiramate is likely contributing to memory/cognitive issues she is experiencing. She did not understand plan to add topiramate to phentermine to mimic Qysmia and would benefit from resuming phentermine. She would benefit from reducing dose and resuming phentermine to see if this improves. If no improvement in cognitive symptoms will taper off topiramate completely and discuss other options. Difficulty with remembering evening dosing with topiramate so it is reasonable to continue morning dose per patient preference.     PLAN:                            Decrease topiramate to 50 mg (2 of 25 mg tablets) once daily this week.   Restart phentermine 37.5 mg once a day in the morning    Follow-up: Return in 1 week (on 5/16/2024) for with me, using a phone visit at 9:00 AM.    Juany Jeronimo, PharmD, BCACP  Medication Therapy Management Pharmacist  [facility]  [phone]    SUBJECTIVE/OBJECTIVE:                          Snow Hawthorne is a 63 year old female called for a follow-up visit.       Reason for visit: Weight management follow-up.    Allergies/ADRs: Reviewed in chart  Past Medical History: Reviewed in chart  Tobacco: She reports that she has quit smoking. She has a 20 pack-year smoking history. She has never used smokeless tobacco.  Alcohol: not currently using      Medication Adherence/Access:   No issues reported    Obesity   Topiramate 25 mg 3 tablets in the morning  Phentermine 37.5 mg once daily- not taking didn't realize she was supposed to continue  Weight down 234.5 lb. She has increased up to 3 tablets for about a week now. She did not realize she was to continue phentermine when she started topiramate so she has been off this.   She has noticed she is more absent minded and has trouble with memory and word finding. She has driven past  house without realizing it 2 times which is concerning for her. She thinks she started noticing this when she was on the 50 mg dose.   She also had severe constipation that required her to use an enema after miralax and stool softer were not effective, she did not take tramadol during this time. She was also on doxycycline for an infection when this occurred so that may have also contributed. Constipation has now improved and she is off doxycycline.   She does think she may have reduced appetite but not sure it has made a big difference.   Nutrition/Eating Habits:   3 meals a day  Evening snacking after dinner has been an issue  Trying to reduce total calories, eat better snacks  Drinks regular coke (1-2 cans per day), zero gatorade, sparking water, coffee  Exercise/Activity: walks.   Medication History:  -phentermine- currently on      Today's Vitals: LMP 10/12/2003   ----------------      I spent 18 minutes with this patient today. All changes were made via collaborative practice agreement with Carli Orta MD. A copy of the visit note was provided to the patient's provider(s).    A summary of these recommendations was sent via [employer].    Juany Jeronimo, PharmD, BCACP  Medication Therapy Management Pharmacist  [facility]  [phone]      Telemedicine Visit Details  Type of service:  Telephone visit  Start Time:  8:36 AM  End Time:  8:54 AM     Medication Therapy Recommendations  Class 3 severe obesity with body mass index (BMI) of 40.0 to 44.9 in adult, unspecified obesity type, unspecified whether serious comorbidity present (H)    Current Medication: phentermine (ADIPEX-P) 37.5 MG capsule   Rationale: Does not understand instructions - Adherence - Adherence   Recommendation: Provide Education   Status: Patient Agreed - Adherence/Education          Current Medication: topiramate (TOPAMAX) 25 MG tablet   Rationale: Undesirable effect - Adverse medication event - Safety   Recommendation:  Decrease Dose   Status: Accepted per CPA

## 2024-05-09 NOTE — PATIENT INSTRUCTIONS
Recommendations from today's MTM visit:                                                       Decrease topiramate to 50 mg (2 of 25 mg tablets) once daily this week.   Restart phentermine 37.5 mg once a day in the morning    Follow-up: Return in 1 week (on 5/16/2024) for with me, using a phone visit at 9:00 AM.    It was great speaking with you today.  I value your experience and would be very thankful for your time in providing feedback in our clinic survey. In the next few days, you may receive an email or text message from "[employer]" with a link to a survey related to your clinical pharmacist.    To schedule another MTM appointment, please call [phone].    My Clinical Pharmacist's contact information:                                                      Please feel free to contact me with any questions or concerns you have.      Juany Jeronimo, PharmD, BCACP  Medication Therapy Management Pharmacist  [facility]  [phone]

## 2024-05-16 ENCOUNTER — VIRTUAL VISIT (OUTPATIENT)
Dept: PHARMACY | Facility: PHYSICIAN GROUP | Age: 64
End: 2024-05-16
Payer: MEDICAID

## 2024-05-16 DIAGNOSIS — E66.813 CLASS 3 SEVERE OBESITY WITH BODY MASS INDEX (BMI) OF 40.0 TO 44.9 IN ADULT, UNSPECIFIED OBESITY TYPE, UNSPECIFIED WHETHER SERIOUS COMORBIDITY PRESENT (H): Primary | ICD-10-CM

## 2024-05-16 DIAGNOSIS — J30.9 ALLERGIC RHINITIS, UNSPECIFIED SEASONALITY, UNSPECIFIED TRIGGER: ICD-10-CM

## 2024-05-16 DIAGNOSIS — E66.01 CLASS 3 SEVERE OBESITY WITH BODY MASS INDEX (BMI) OF 40.0 TO 44.9 IN ADULT, UNSPECIFIED OBESITY TYPE, UNSPECIFIED WHETHER SERIOUS COMORBIDITY PRESENT (H): Primary | ICD-10-CM

## 2024-05-16 PROCEDURE — 99207 PR NO CHARGE LOS: CPT | Mod: 93 | Performed by: PHARMACIST

## 2024-05-16 NOTE — PATIENT INSTRUCTIONS
Recommendations from today's MTM visit:                                                       Wean off topiramate. Take 25 mg 1 tablet daily for 1 week, then stop.  Continue phentermine for now.     Follow-up: Return in 2 weeks (on 5/30/2024) for with me, using a phone visit at 9:30 AM.    It was great speaking with you today.  I value your experience and would be very thankful for your time in providing feedback in our clinic survey. In the next few days, you may receive an email or text message from [employer] with a link to a survey related to your clinical pharmacist.    To schedule another MTM appointment, please call [phone].    My Clinical Pharmacist's contact information:                                                      Please feel free to contact me with any questions or concerns you have.      Juany Jeronimo, PharmD, BCACP  Medication Therapy Management Pharmacist  [facility]  [phone]

## 2024-05-16 NOTE — PROGRESS NOTES
Medication Therapy Management (MTM) Encounter    ASSESSMENT:                            Medication Adherence/Access: No issues identified  _  Weight management:   She would benefit from tapering off topiramate as it is likely contributing to cognitive and mood side effects. Reasonable to continue phentermine for now. May consider discussing viability of compounded semaglutide at future visit.   _  Allergies:   Increased symptoms but managing with current therapy.  Patient would benefit from avoiding triggers as able.    PLAN:                            Wean off topiramate. Take 25 mg 1 tablet daily for 1 week, then stop.  Continue phentermine for now.     Follow-up: Return in 2 weeks (on 5/30/2024) for with me, using a phone visit at 9:30 AM.    Juany Jeronimo, PharmD, BCACP  Medication Therapy Management Pharmacist  [facility]  [phone]    SUBJECTIVE/OBJECTIVE:                          Snow Hawthorne is a 63 year old female called for a follow-up visit.       Reason for visit: Weight management follow-up.    Allergies/ADRs: Reviewed in chart  Past Medical History: Reviewed in chart  Tobacco: She reports that she has quit smoking. She has a 20 pack-year smoking history. She has never used smokeless tobacco.  Alcohol: not currently using    Medication Adherence/Access:   No issues reported    Weight Management   Topiramate 25 mg 2 tablets in the morning  Phentermine 37.5 mg once daily- restarted    Weight staying at 234.5 lb no further weight loss.   Noticing her depression and mood symptoms are worse. Crying. Not happened when she was just on phentermine in the past. Does still have more absent minded and has trouble with memory and word finding. She has driven past house without realizing it 2 times which is concerning for her. She does think she may have reduced appetite but not sure it has made a big difference.   Nutrition/Eating Habits:   3 meals a day  Evening snacking after dinner has  been an issue  Trying to reduce total calories, eat better snacks  Drinks regular coke (1-2 cans per day), zero gatorade, sparking water, coffee  Exercise/Activity: walks.   Medication History:  -phentermine- currently on       Allergies:   Loratadine 10 mg twice a day- during bad allergy symptoms  Fluticasone Propionate 50 MCG/ACT Suspension 1 spray(s) intranasally each nostril once a day  Benadryl Allergy 25 MG Tablet 1 tablet as needed for hives Orally Once a day  Albuterol Sulfate  (90 Base) MCG/ACT Aerosol Solution 1-2 puff(s) prn Inhalation every 4 hrs  Benzonatate 200 MG Capsule 1 capsule Orally Three times a day as needed cough  EpiPen 2-Hiren 0.3 MG/0.3ML Solution Auto-injector 0.3 mg intramuscularly once  Patient reports no current medication side effects.    Allergy symptoms have been worse lately.She is trying to avoid exposure and removing clothes after coming inside as well as showering at night. Fluticasone nasal spray has really helped. She is having more eye symptoms. Taking loratadine twice a day right now.     Today's Vitals: LMP 10/12/2003   ----------------      I spent 13 minutes with this patient today. All changes were made via collaborative practice agreement with Debbie Hernandez PA-C. A copy of the visit note was provided to the patient's provider(s).    A summary of these recommendations was sent via [employer].    Juany Jeronimo, PharmD, BCACP  Medication Therapy Management Pharmacist  [facility] [place]  [phone]      Telemedicine Visit Details  Type of service:  Telephone visit  Start Time:  9:17 AM  End Time:  9:30 AM     Medication Therapy Recommendations  Class 3 severe obesity with body mass index (BMI) of 40.0 to 44.9 in adult, unspecified obesity type, unspecified whether serious comorbidity present (H)    Current Medication: topiramate (TOPAMAX) 25 MG tablet   Rationale: Undesirable effect - Adverse medication event - Safety   Recommendation: Discontinue Medication    Status: Accepted per CPA

## 2024-05-30 ENCOUNTER — VIRTUAL VISIT (OUTPATIENT)
Dept: PHARMACY | Facility: PHYSICIAN GROUP | Age: 64
End: 2024-05-30
Payer: MEDICARE

## 2024-05-30 DIAGNOSIS — E66.813 CLASS 3 SEVERE OBESITY WITH BODY MASS INDEX (BMI) OF 40.0 TO 44.9 IN ADULT, UNSPECIFIED OBESITY TYPE, UNSPECIFIED WHETHER SERIOUS COMORBIDITY PRESENT (H): Primary | ICD-10-CM

## 2024-05-30 DIAGNOSIS — E66.01 CLASS 3 SEVERE OBESITY WITH BODY MASS INDEX (BMI) OF 40.0 TO 44.9 IN ADULT, UNSPECIFIED OBESITY TYPE, UNSPECIFIED WHETHER SERIOUS COMORBIDITY PRESENT (H): Primary | ICD-10-CM

## 2024-05-30 PROCEDURE — 99207 PR NO CHARGE LOS: CPT | Mod: 93 | Performed by: PHARMACIST

## 2024-05-30 NOTE — PATIENT INSTRUCTIONS
Recommendations from today's MTM visit:                                                       Continue phentermine 37.5 mg once daily  Continue to work on increasing physcial activity and eating healthy snacks at night. You are making great improvements!    Follow-up: Return in 3 months (on 8/16/2024) for with me, in person at 8:30 am.    It was great speaking with you today.  I value your experience and would be very thankful for your time in providing feedback in our clinic survey. In the next few days, you may receive an email or text message from [employer] with a link to a survey related to your clinical pharmacist.    To schedule another MTM appointment, please call [phone].    My Clinical Pharmacist's contact information:                                                      Please feel free to contact me with any questions or concerns you have.      Juany Jeronimo, PharmD, BCACP  Medication Therapy Management Pharmacist  [facility]  [phone]

## 2024-05-30 NOTE — PROGRESS NOTES
Medication Therapy Management (MTM) Encounter    ASSESSMENT:                            Medication Adherence/Access: No issues identified  _  Weight management:   Side effects improved with stopping topiramate- added to intolerance list.   Weight loss occurring with resuming phentermine. Reasonable to continue for 3 months and monitor weight loss. If no significant weight loss seen will stop and discuss alternatives, like compounded semaglutide.    PLAN:                            Continue phentermine 37.5 mg once daily  Continue to work on increasing physcial activity and eating healthy snacks at night. You are making great improvements!    Follow-up: Return in 3 months (on 8/16/2024) for with me, in person at 8:30 am.    Juany Jeronimo, PharmD, BCACP  Medication Therapy Management Pharmacist  [facility]  [phone]    SUBJECTIVE/OBJECTIVE:                          Snow Hawthorne is a 63 year old female called for a follow-up visit.       Reason for visit: Weight management follow-up.    Allergies/ADRs: Reviewed in chart  Past Medical History: Reviewed in chart  Tobacco: She reports that she has quit smoking. She has a 20 pack-year smoking history. She has never used smokeless tobacco.  Alcohol: not currently using    Medication Adherence/Access:   No issues reported    Weight Management   Phentermine 37.5 mg once daily- restarted  Now off topiramate and feels much better. Memory and mood have improved since stopping. No longer having crying spells.   Weight has gone down to 231 lb.   She restarted phentermine and does think it is helpful for reducing appetite.   Nutrition/Eating Habits:   3 meals a day  Evening snacking after dinner has been an issue  Trying to reduce total calories, eat better snacks  Stopped keeping regular coke in the home, zero gatorade, sparking water, coffee  Exercise/Activity: walks.   Medication History:  -phentermine- currently on       Today's Vitals: LMP 10/12/2003    ----------------      I spent 16 minutes with this patient today. All changes were made via collaborative practice agreement with Debbie Hernandez PA-C. A copy of the visit note was provided to the patient's provider(s).    A summary of these recommendations was sent via [employer].    Juany Jeronimo, PharmD, BCACP  Medication Therapy Management Pharmacist  [facility]  [phone]      Telemedicine Visit Details  Type of service:  Telephone visit  Start Time:  9:41 AM  End Time: 9:57 AM     Medication Therapy Recommendations  No medication therapy recommendations to display

## 2024-09-10 ENCOUNTER — OFFICE VISIT (OUTPATIENT)
Dept: PHARMACY | Facility: PHYSICIAN GROUP | Age: 64
End: 2024-09-10
Payer: MEDICAID

## 2024-09-10 DIAGNOSIS — E66.813 CLASS 3 SEVERE OBESITY WITH BODY MASS INDEX (BMI) OF 40.0 TO 44.9 IN ADULT, UNSPECIFIED OBESITY TYPE, UNSPECIFIED WHETHER SERIOUS COMORBIDITY PRESENT (H): Primary | ICD-10-CM

## 2024-09-10 DIAGNOSIS — E66.01 CLASS 3 SEVERE OBESITY WITH BODY MASS INDEX (BMI) OF 40.0 TO 44.9 IN ADULT, UNSPECIFIED OBESITY TYPE, UNSPECIFIED WHETHER SERIOUS COMORBIDITY PRESENT (H): Primary | ICD-10-CM

## 2024-09-10 PROCEDURE — 99207 PR NO CHARGE LOS: CPT | Performed by: PHARMACIST

## 2024-09-10 NOTE — PROGRESS NOTES
Medication Therapy Management (MTM) Encounter    ASSESSMENT:                            Medication Adherence/Access: No issues identified  _  Weight management:   BMI is >30 and she is an appropriate candidate for medication changes along with diet and exercise to help with weight loss goals. Three month trial of phentermine was not effective in reaching her goal weight loss. Reviewed options at this time and GLP-1 therapy would likely be the most effective option. Insurance does not cover GLP-1 for weight loss indication and discussed option of compounded semaglutide at lower cost. Reviewed semaglutide action, dosing, administration, storage, possible common/rare side effects, costs, and potential for unavailability in the future.   __________________________  PLAN:                            Stop phentermine  Start semaglutide. One month vial is $230.   Week 1-4: Inject 0.25 mg (5 units) once a week  Week 5-8: Inject 0.5 mg (10 units) once a week  Week 9-on: Inject 1 mg (20 units) once a week  How to Inject Compounded Weight Loss Medication- Semaglutide      How it is supplied:  You will receive 1ml or 2ml vial of medicine depending on your dose. You will also be sent syringes and needles for a month supply.    Storage:   - Keep your medication stored in the fridge.   - The vials are to be discarded 28 days after first use or expiration date, whichever is first (even if there is remaining medication).     Injecting:  - Remove your vial from the refrigerator and allow to reach room temperature by sitting out on a counter/ table.  - Using the syringes provided with the medication vial, withdraw the prescribed amount of medication to fill the syringe for your dose.         Follow the instructions provided to fill the syringe with medicine  Follow the guide provided to inject your medication      [facility]: [phone]        Follow-up: Return in 3 weeks (on 10/1/2024) for with me, using a phone  "visit at 9:00 AM .    SUBJECTIVE/OBJECTIVE:                          Snow Hawthorne is a 63 year old female seen for a follow-up visit.       Reason for visit: Weight management follow-up.    Allergies/ADRs: Reviewed in chart  Past Medical History: Reviewed in chart  Tobacco: She reports that she has quit smoking. She has a 20 pack-year smoking history. She has never used smokeless tobacco.  Alcohol: not currently using    Medication Adherence/Access: no issues reported    Weight Management   Phentermine 37.5 mg once daily    She has continued on phentermine and it initially was helpful for appetite and lost some weight but now after 3 months has plateaued and really hasn't seen much difference. She continues to eat healthier and be more active. At this point she would like to try GLP-1 therapy and is interested in discussing options that may be more affordable. Her insurance does not cover GLP-1 agonist for weight loss at this time.   Nutrition/Eating Habits:   3 meals a day  Evening snacking after dinner has been an issue  Trying to reduce total calories, eat better snacks  Stopped keeping regular coke in the home, zero gatorade, sparking water, coffee  Exercise/Activity: walks.   Medication History:  -phentermine- currently on  - topiramate- memory issues    Initial consult weight: 235 lb      Current weight today: 231 lbs 0 oz  Cumulative Weight Loss: -4 lb, -1.7% from baseline      Today's Vitals: /78 (BP Location: Right arm, Patient Position: Sitting, Cuff Size: Adult Large)   Ht 5' 2.5\" (1.588 m)   Wt 231 lb (104.8 kg)   LMP 10/12/2003   BMI 41.58 kg/m    ----------------      I spent 30 minutes with this patient today. All changes were made via collaborative practice agreement with Debbie Hernandez PA-C. A copy of the visit note was provided to the patient's provider(s).    A summary of these recommendations was given to the patient.    Juany Jeronimo, PharmD, BCACP  Medication Therapy " Management Pharmacist  [facility]  [phone]           Medication Therapy Recommendations  Class 3 severe obesity with body mass index (BMI) of 40.0 to 44.9 in adult, unspecified obesity type, unspecified whether serious comorbidity present (H)    Current Medication: phentermine (ADIPEX-P) 37.5 MG capsule   Rationale: Condition refractory to medication - Ineffective medication - Effectiveness   Recommendation: Change Medication - Semaglutide-Weight Management 0.25 MG/0.5ML pen   Status: Accepted per CPA

## 2024-09-10 NOTE — PATIENT INSTRUCTIONS
Recommendations from today's MTM visit:                                                      Stop phentermine  Start semaglutide. One month vial is $230.   Week 1-4: Inject 0.25 mg once a week  Week 5-8: Inject 0.5 mg once a week  Week 9-on: Inject 1 mg once a week  How to Inject Compounded Weight Loss Medication- Semaglutide      How it is supplied:  You will receive 1ml or 2ml vial of medicine depending on your dose. You will also be sent syringes and needles for a month supply.    Storage:   Keep your medication stored in the fridge.   The vials are to be discarded 28 days after first use or expiration date, whichever is first (even if there is remaining medication).     Injecting:  Remove your vial from the refrigerator and allow to reach room temperature by sitting out on a counter/ table.  Using the syringes provided with the medication vial, withdraw the prescribed amount of medication to fill the syringe for your dose.             Follow the instructions to fill the syringe with medicine:       Follow the guide below to inject your medication:       [facility]: [phone]      It was great speaking with you today.  I value your experience and would be very thankful for your time in providing feedback in our clinic survey. In the next few days, you may receive an email or text message from [employer] with a link to a survey related to your clinical pharmacist.    To schedule another MTM appointment, please call [phone].    My Clinical Pharmacist's contact information:                                                      Please feel free to contact me with any questions or concerns you have.      Juany Jeronimo, PharmD, BCACP  Medication Therapy Management Pharmacist  [facility]  [phone]

## 2024-09-13 VITALS
BODY MASS INDEX: 40.93 KG/M2 | WEIGHT: 231 LBS | DIASTOLIC BLOOD PRESSURE: 78 MMHG | HEIGHT: 63 IN | SYSTOLIC BLOOD PRESSURE: 120 MMHG

## 2024-10-01 ENCOUNTER — VIRTUAL VISIT (OUTPATIENT)
Dept: PHARMACY | Facility: PHYSICIAN GROUP | Age: 64
End: 2024-10-01

## 2024-10-01 DIAGNOSIS — E66.01 CLASS 3 SEVERE OBESITY WITH BODY MASS INDEX (BMI) OF 40.0 TO 44.9 IN ADULT, UNSPECIFIED OBESITY TYPE, UNSPECIFIED WHETHER SERIOUS COMORBIDITY PRESENT (H): Primary | ICD-10-CM

## 2024-10-01 DIAGNOSIS — E66.813 CLASS 3 SEVERE OBESITY WITH BODY MASS INDEX (BMI) OF 40.0 TO 44.9 IN ADULT, UNSPECIFIED OBESITY TYPE, UNSPECIFIED WHETHER SERIOUS COMORBIDITY PRESENT (H): Primary | ICD-10-CM

## 2024-10-01 PROCEDURE — 99207 PR NO CHARGE LOS: CPT | Mod: 93 | Performed by: PHARMACIST

## 2024-10-01 RX ORDER — VENLAFAXINE HYDROCHLORIDE 150 MG/1
150 TABLET, EXTENDED RELEASE ORAL DAILY
COMMUNITY

## 2024-10-01 RX ORDER — LORATADINE 10 MG/1
10 TABLET ORAL DAILY
COMMUNITY

## 2024-10-01 RX ORDER — UREA 10 %
2 LOTION (ML) TOPICAL DAILY
COMMUNITY

## 2024-10-01 NOTE — PATIENT INSTRUCTIONS
Recommendations from today's MTM visit:                                                         Continue compounded semaglutide.   Inject 0.25 mg (5 units) once a week for 2 more weeks (10/6 and 10/13)    Then increase to, Inject 0.5 mg (10 units) once a week for 4 weeks- (starting 10/20)    Follow-up: Return in 5 weeks (on 11/5/2024) for with me, using a phone visit at 9:00 AM.    It was great speaking with you today.  I value your experience and would be very thankful for your time in providing feedback in our clinic survey. In the next few days, you may receive an email or text message from [employer] with a link to a survey related to your clinical pharmacist.    To schedule another MTM appointment, please call [phone].    My Clinical Pharmacist's contact information:                                                      Please feel free to contact me with any questions or concerns you have.      Juany Jeronimo, PharmD, BCACP  Medication Therapy Management Pharmacist  [facility]  [phone]

## 2024-10-01 NOTE — PROGRESS NOTES
Medication Therapy Management (MTM) Encounter    ASSESSMENT:                            Medication Adherence/Access: No issues identified  _  Weight management  BMI is >30 and she is an appropriate candidate for GLP-1 therapy along with diet and exercise to help with weight loss goals. Tolerating initial two doses of semaglutide well with reported 11.5 lb weight loss from weight reported prior to starting therapy. She would benefit from completed two more weeks at this dose then escalating to next 0.5 mg dose.   Insurance does not cover GLP-1 for weight loss indication and  is using compounded semaglutide through [facility] at lower cost. She is aware compounded product is not FDA-approved and potential for unavailability in the future.   ________________  PLAN:                            Continue compounded semaglutide.   Inject 0.25 mg (5 units) once a week for 2 more weeks (10/6 and 10/13)    Then increase to, Inject 0.5 mg (10 units) once a week for 4 weeks- (starting 10/20)    Follow-up: Return in 5 weeks (on 11/5/2024) for with me, using a phone visit at 9:00 AM.    SUBJECTIVE/OBJECTIVE:                          Snow Hawthorne is a 64 year old female seen for a follow-up visit.       Reason for visit: Weight management follow-up.    Allergies/ADRs: Reviewed in chart  Past Medical History: Reviewed in chart  Tobacco: She reports that she has quit smoking. She has a 20 pack-year smoking history. She has never used smokeless tobacco.  Alcohol: not currently using    Medication Adherence/Access: no issues reported    Weight Management   Semaglutide (compound) 0.25 mg (5 units) once a week (Sunday)    She had a little bit of nausea after the first dose but not as bad after the second dose. She does notice she feels full faster and isn't eating as large of portions. She has not been drinking as much water as she should be as she has been driving a lot for family so did have some constipation and took  Miralax yesterday. She was delayed in starting semaglutide injections due to getting steroid injection and instructed to wait until after to being this. She has not had any issues preparing or administering using compounded semaglutide vials.   Nutrition/Eating Habits: 3 meals a day, less hungry and eating smaller portions  Exercise/Activity: walks, less the past few weeks due to driving for family members   Medication History:  -phentermine- plateau after a couple months  - topiramate- memory issues    Initial consult weight: 240 lb per patient home weight before first dose     Current weight today: 228.5 lb  Cumulative Weight Loss: -11.5 lb, -4.8% from baseline    ----------------      I spent 14 minutes with this patient today. All changes were made via collaborative practice agreement with Debbie Hernandez PA-C. A copy of the visit note was provided to the patient's provider(s).    A summary of these recommendations was sent via [employer]    Juany Jeronimo, PharmD, BCACP  Medication Therapy Management Pharmacist  [facility]  [phone]      Telemedicine Visit Details  The patient's medications can be safely assessed via a telemedicine encounter.  Type of service:  Telephone visit  Originating Location (pt. Location): Home    Distant Location (provider location):  On-site  Start Time:  9:09 AM  End Time: 9:23 AM     Medication Therapy Recommendations  Class 3 severe obesity with body mass index (BMI) of 40.0 to 44.9 in adult, unspecified obesity type, unspecified whether serious comorbidity present (H)    Current Medication: Semaglutide-Weight Management (WEGOVY) 0.25 MG/0.5ML pen   Rationale: Dose too low - Dosage too low - Effectiveness   Recommendation: Increase Dose - Semaglutide-Weight Management 0.5 MG/0.5ML pen   Status: Accepted per CPA

## 2024-10-12 ENCOUNTER — HEALTH MAINTENANCE LETTER (OUTPATIENT)
Age: 64
End: 2024-10-12

## 2024-11-05 ENCOUNTER — VIRTUAL VISIT (OUTPATIENT)
Dept: PHARMACY | Facility: PHYSICIAN GROUP | Age: 64
End: 2024-11-05
Payer: MEDICAID

## 2024-11-05 DIAGNOSIS — E66.01 CLASS 3 SEVERE OBESITY WITH BODY MASS INDEX (BMI) OF 40.0 TO 44.9 IN ADULT, UNSPECIFIED OBESITY TYPE, UNSPECIFIED WHETHER SERIOUS COMORBIDITY PRESENT (H): Primary | ICD-10-CM

## 2024-11-05 DIAGNOSIS — E66.813 CLASS 3 SEVERE OBESITY WITH BODY MASS INDEX (BMI) OF 40.0 TO 44.9 IN ADULT, UNSPECIFIED OBESITY TYPE, UNSPECIFIED WHETHER SERIOUS COMORBIDITY PRESENT (H): Primary | ICD-10-CM

## 2024-11-05 PROCEDURE — 99207 PR NO CHARGE LOS: CPT | Mod: 93 | Performed by: PHARMACIST

## 2024-11-05 NOTE — PATIENT INSTRUCTIONS
Recommendations from today's MTM visit:                                                       Continue semaglutide 0.5 (10 units) once a week the next month. Sent refill order to [facility].     Follow-up: Return in 4 weeks (on 12/3/2024) for medication therapy management, with me, using a phone visit at 9:00 AM .    It was great speaking with you today.  I value your experience and would be very thankful for your time in providing feedback in our clinic survey. In the next few days, you may receive an email or text message from [employer] with a link to a survey related to your clinical pharmacist.    To schedule another MTM appointment, please call [phone].    My Clinical Pharmacist's contact information:                                                      Please feel free to contact me with any questions or concerns you have.      Juany Jeronimo, PharmD, BCACP  Medication Therapy Management Pharmacist  [facility]  [phone]

## 2024-11-05 NOTE — PROGRESS NOTES
Medication Therapy Management (MTM) Encounter    ASSESSMENT:                            Medication Adherence/Access: No issues identified  _  Weight management  BMI is >30 and she is an appropriate candidate for GLP-1 therapy along with diet and exercise to help with weight loss goals.   Tolerating current semaglutide dose well and has lost 5.4% of weight from baseline prior to semaglutide therapy. Reasonable to slow dose escalation this month and continue at current dose.   Insurance does not cover GLP-1 for weight loss indication and  is using compounded semaglutide through [facility] at lower cost. She is aware compounded product is not FDA-approved and potential for unavailability in the future.   ________________________  PLAN:                            Continue semaglutide 0.5 (10 units) once a week the next month. Sent refill order to [facility].     Follow-up: Return in 4 weeks (on 12/3/2024) for medication therapy management, with me, using a phone visit at 9:00 AM .    SUBJECTIVE/OBJECTIVE:                          Snow Hawthorne is a 64 year old female seen for a follow-up visit.       Reason for visit: Weight management follow-up.    Allergies/ADRs: Reviewed in chart  Past Medical History: Reviewed in chart  Tobacco: She reports that she has quit smoking. She has a 20 pack-year smoking history. She has never used smokeless tobacco.  Alcohol: not currently using    Medication Adherence/Access: no issues reported.    Weight Management   Semaglutide (compound) 0.5 mg (10 units) once a week (Sunday)    Increased dose after last visit, done 3 weeks of 0.5 mg so far. She does notice she is not at hungry and definitely feels full faster. She has started to learn the portions she can eat to not be uncomfortable.   She gets a little nausea at times but it does go away. Constipation at times as well and uses Miralax which helps. She is working on drinking more water.   Fit in a  smaller pant size last week which was encouraging.    She has not had any issues preparing or administering using compounded semaglutide vials.   Since she is getting results at current dose she would like to stay a this dose for another month.   Nutrition/Eating Habits: 3 meals a day, less hungry and eating smaller portions  Exercise/Activity: walks, less the past few weeks due to driving for family members and trouble with her knee  Medication History:  -phentermine- plateau after a couple months  - topiramate- memory issues    Initial consult weight: 240 lb per patient home weight before first dose     Current weight today: 227 lb  Cumulative Weight Loss: -13 lb, -5.4% from baseline    Today's Vitals: LMP 10/12/2003   ----------------      I spent 14 minutes with this patient today. All changes were made via collaborative practice agreement with Debbie Hernandez PA-C. A copy of the visit note was provided to the patient's provider(s).    A summary of these recommendations was sent via [employer].    Juany Jeronimo, PharmD, BCACP  Medication Therapy Management Pharmacist  [facility]  [phone]      Telemedicine Visit Details  The patient's medications can be safely assessed via a telemedicine encounter.  Type of service:  Telephone visit  Originating Location (pt. Location): Home    Distant Location (provider location):  On-site  Start Time: 8:58 AM  End Time:  9:12 AM     Medication Therapy Recommendations  No medication therapy recommendations to display

## 2024-12-03 ENCOUNTER — VIRTUAL VISIT (OUTPATIENT)
Dept: PHARMACY | Facility: PHYSICIAN GROUP | Age: 64
End: 2024-12-03
Payer: MEDICAID

## 2024-12-03 DIAGNOSIS — E66.01 CLASS 3 SEVERE OBESITY WITH BODY MASS INDEX (BMI) OF 40.0 TO 44.9 IN ADULT, UNSPECIFIED OBESITY TYPE, UNSPECIFIED WHETHER SERIOUS COMORBIDITY PRESENT (H): Primary | ICD-10-CM

## 2024-12-03 DIAGNOSIS — E66.813 CLASS 3 SEVERE OBESITY WITH BODY MASS INDEX (BMI) OF 40.0 TO 44.9 IN ADULT, UNSPECIFIED OBESITY TYPE, UNSPECIFIED WHETHER SERIOUS COMORBIDITY PRESENT (H): Primary | ICD-10-CM

## 2024-12-03 PROCEDURE — 99207 PR NO CHARGE LOS: CPT | Mod: 93 | Performed by: PHARMACIST

## 2024-12-03 NOTE — PATIENT INSTRUCTIONS
Recommendations from today's MTM visit:                                                       Continue semaglutide 1 (20 units) once a week this month. Call [facility] to request fill of previous 1 mg semaglutide dose ordered.      Follow-up: Return in 27 days (on 12/30/2024) for medication therapy management, with me, using a phone visit at 9:00 AM .    It was great speaking with you today.  I value your experience and would be very thankful for your time in providing feedback in our clinic survey. In the next few days, you may receive an email or text message from [employer] with a link to a survey related to your clinical pharmacist.    To schedule another MTM appointment, please call [phone].    My Clinical Pharmacist's contact information:                                                      Please feel free to contact me with any questions or concerns you have.      Juany Jeronimo, PharmD, [facility]  Medication Therapy Management Pharmacist  [facility]  [phone]

## 2024-12-03 NOTE — PROGRESS NOTES
Medication Therapy Management (MTM) Encounter    ASSESSMENT:                            Medication Adherence/Access: No issues identified.  _  Weight management  BMI is >30 and she is an appropriate candidate for GLP-1 therapy along with diet and exercise to help with weight loss goals.   Tolerating current semaglutide dose well and has lost 7% of weight from baseline prior to semaglutide therapy. She did escalate dose this week to 1 mg (20 units) and would benefit from continuing this dose for the next month.   Insurance does not cover GLP-1 for weight loss indication and  is using compounded semaglutide through [facility] at lower cost. She is aware compounded product is not FDA-approved and potential for unavailability in the future.   ___________________________  PLAN:                            Continue semaglutide 1 (20 units) once a week this month. Call [facility] to request fill of previous 1 mg semaglutide dose ordered.      Follow-up: Return in 27 days (on 12/30/2024) for medication therapy management, with me, using a phone visit at 9:00 AM .    SUBJECTIVE/OBJECTIVE:                          Snow Hawthorne is a 64 year old female seen for a follow-up visit.       Reason for visit: Weight management follow-up.    Allergies/ADRs: Reviewed in chart  Past Medical History: Reviewed in chart  Tobacco: She reports that she has quit smoking. She has a 20 pack-year smoking history. She has never used smokeless tobacco.  Alcohol: not currently using    Medication Adherence/Access: no issues reported.    Weight Management   Semaglutide (compound) 1 mg (20 units) once a week (Sunday)    She had continued on 0.5 mg dose (10 units) this past month but increased to 1 mg (20 units) this past Sunday after discussing with provider at her recent visit. She has not experienced any significant side effects from the injections, such as nausea or abnormal bowel movements.  She is encouraged by  weight loss and feels like her stomach is looking smaller.   Nutrition/Eating Habits: 3 meals a day, less hungry and eating smaller portions  Exercise/Activity: walks, less the past few weeks due to driving for family members and trouble with her knee  Medication History:  -phentermine- plateau after a couple months  - topiramate- memory issues    Initial consult weight: 240 lb per patient home weight before first dose     Current weight today: 223 lb  Cumulative Weight Loss: -17 lb, -7% from baseline    ----------------      I spent 11 minutes with this patient today. All changes were made via collaborative practice agreement with Debbie Hernandez PA-C. A copy of the visit note was provided to the patient's provider(s).    A summary of these recommendations was sent via [employer].    Juany Jeronimo, PharmD, BCACP  Medication Therapy Management Pharmacist  [facility]  [phone]      Telemedicine Visit Details  The patient's medications can be safely assessed via a telemedicine encounter.  Type of service:  Telephone visit  Originating Location (pt. Location): Home    Distant Location (provider location):  On-site  Start Time:  9:03 AM  End Time: 9:15 AM     Medication Therapy Recommendations  No medication therapy recommendations to display

## 2024-12-30 ENCOUNTER — VIRTUAL VISIT (OUTPATIENT)
Dept: PHARMACY | Facility: PHYSICIAN GROUP | Age: 64
End: 2024-12-30
Payer: MEDICARE

## 2024-12-30 DIAGNOSIS — E66.01 CLASS 3 SEVERE OBESITY WITH BODY MASS INDEX (BMI) OF 40.0 TO 44.9 IN ADULT, UNSPECIFIED OBESITY TYPE, UNSPECIFIED WHETHER SERIOUS COMORBIDITY PRESENT (H): Primary | ICD-10-CM

## 2024-12-30 DIAGNOSIS — E66.813 CLASS 3 SEVERE OBESITY WITH BODY MASS INDEX (BMI) OF 40.0 TO 44.9 IN ADULT, UNSPECIFIED OBESITY TYPE, UNSPECIFIED WHETHER SERIOUS COMORBIDITY PRESENT (H): Primary | ICD-10-CM

## 2024-12-30 PROCEDURE — 99207 PR NO CHARGE LOS: CPT | Mod: 93 | Performed by: PHARMACIST

## 2024-12-30 NOTE — PATIENT INSTRUCTIONS
Recommendations from today's MTM visit:                                                         Continue semaglutide 1 (20 units) once a week this month. Call [facility] to request refills at least 1 week before running out of medication.     Follow-up: Return in 11 weeks (on 3/17/2025) for medication therapy management, with me, using a phone visit at 9:00 AM .    It was great speaking with you today.  I value your experience and would be very thankful for your time in providing feedback in our clinic survey. In the next few days, you may receive an email or text message from [employer] with a link to a survey related to your clinical pharmacist.    To schedule another MTM appointment, please call [phone].    My Clinical Pharmacist's contact information:                                                      Please feel free to contact me with any questions or concerns you have.      Juany Jeronimo, PharmD, BCACP  Medication Therapy Management Pharmacist  [facility]  [phone]

## 2024-12-30 NOTE — PROGRESS NOTES
Medication Therapy Management (MTM) Encounter    ASSESSMENT:                            Medication Adherence/Access: No issues identified.  _  Weight management  BMI is >30 and she is an appropriate candidate for GLP-1 therapy along with diet and exercise to help with weight loss goals.   Tolerating current semaglutide dose well and has lost 8.3% of weight from baseline prior to semaglutide therapy. Reasonable to continue dose per patient preference.   Insurance does not cover GLP-1 for weight loss indication and  is using compounded semaglutide through [facility] at lower cost. She is aware compounded product is not FDA-approved and potential for unavailability in the future.   _________________  PLAN:                            Continue semaglutide 1 (20 units) once a week this month. Call [facility] to request refills at least 1 week before running out of medication.     Follow-up: Return in 11 weeks (on 3/17/2025) for medication therapy management, with me, using a phone visit at 9:00 AM .    SUBJECTIVE/OBJECTIVE:                          Snow Hawthorne is a 64 year old female seen for a follow-up visit.       Reason for visit: Weight management follow-up.    Allergies/ADRs: Reviewed in chart  Past Medical History: Reviewed in chart  Tobacco: She reports that she has quit smoking. She has a 20 pack-year smoking history. She has never used smokeless tobacco.  Alcohol: not currently using    Medication Adherence/Access: no issues reported.    Weight Management   Semaglutide (compound) 1 mg (20 units) once a week (Sunday)    She has not experienced any significant side effects from the injections, such as nausea or abnormal bowel movements. Theodora helps with mild nausea.   She is encouraged by weight loss and feels like her stomach is looking smaller.   Nutrition/Eating Habits: 3 meals a day, less hungry and eating smaller portions  Exercise/Activity: walks, less active currently due  to pain and undergoing ablation procedures  Medication History:  -phentermine- plateau after a couple months  - topiramate- memory issues    Initial consult weight: 240 lb per patient home weight before first dose     Current weight today: 220 lb  Cumulative Weight Loss: -20 lb, -8.3% from baseline      Today's Vitals: LMP 10/12/2003   ----------------      I spent 13 minutes with this patient today. All changes were made via collaborative practice agreement with Debbie Hernandez PA-C.     A summary of these recommendations was sent via [employer].    Juany Jeronimo, PharmD, BCACP  Medication Therapy Management Pharmacist  [facility]  [phone]      Telemedicine Visit Details  The patient's medications can be safely assessed via a telemedicine encounter.  Type of service:  Telephone visit  Originating Location (pt. Location): Home    Distant Location (provider location):  On-site  Start Time:  9:03 AM  End Time: 9:17 AM     Medication Therapy Recommendations  No medication therapy recommendations to display

## 2025-03-17 ENCOUNTER — VIRTUAL VISIT (OUTPATIENT)
Dept: PHARMACY | Facility: PHYSICIAN GROUP | Age: 65
End: 2025-03-17
Payer: MEDICAID

## 2025-03-17 DIAGNOSIS — E66.01 CLASS 3 SEVERE OBESITY WITH BODY MASS INDEX (BMI) OF 40.0 TO 44.9 IN ADULT, UNSPECIFIED OBESITY TYPE, UNSPECIFIED WHETHER SERIOUS COMORBIDITY PRESENT (H): Primary | ICD-10-CM

## 2025-03-17 DIAGNOSIS — E66.813 CLASS 3 SEVERE OBESITY WITH BODY MASS INDEX (BMI) OF 40.0 TO 44.9 IN ADULT, UNSPECIFIED OBESITY TYPE, UNSPECIFIED WHETHER SERIOUS COMORBIDITY PRESENT (H): Primary | ICD-10-CM

## 2025-03-17 PROCEDURE — 99207 PR NO CHARGE LOS: CPT | Mod: 93 | Performed by: PHARMACIST

## 2025-03-17 NOTE — PATIENT INSTRUCTIONS
Recommendations from today's MTM visit:                                                         - Increase semaglutide to 2.4 mg (48 units) today.   - Use the current vial for the new dosage; it should provide enough for 3 total doses  - Order a new supply of medication in the first week of April and another close to the end of the third week  - Continue tracking your calorie intake and physical activity, especially as you increase your walking  - Maintain your food journal    Follow-Up: Tuesday, April 15th, at 9:00 AM to discuss your progress and plan post-medication strategies.    Options for continuing GLP1/GIP agonist in 2025:    Zepbound Vials through [facility] - vials only available in 2.5 mg, 5 mg, 7.5mg, 10 mg doses  [email or web address]  - $349/month for 2.5 mg vials  - $499/month for 5 mg vials   - 7.5 mg and 10 mg vials now available for $499/month with regular refills (cost increases if refills not consistently filled at least every 45 days - see more info at [employer] [facility] website)  - Additional $5 per month for administrations supplies (syringe/needles, etc)         It was great speaking with you today.  I value your experience and would be very thankful for your time in providing feedback in our clinic survey. In the next few days, you may receive an email or text message from [employer] with a link to a survey related to your clinical pharmacist.    To schedule another MTM appointment, please call [phone].    My Clinical Pharmacist's contact information:                                                      Please feel free to contact me with any questions or concerns you have.      Juany Jeronimo, PharmD, BCACP  Medication Therapy Management Pharmacist  [facility]  [phone]

## 2025-03-17 NOTE — PROGRESS NOTES
Medication Therapy Management (MTM) Encounter    ASSESSMENT:                            Medication Adherence/Access: See below for considerations.  _  Weight Management  Tolerating current semaglutide dose well.   Reports feeling martínez, eating less, and noticing body changes. Weight has been stable at 220-222 lbs the past few months, but overall 20 lbs (8%) lost since starting treatment. Pants size decreased from 20 to 18, shirt from 2X to XL. Patient is tracking calories and making good food choices.  Patient would benefit from:  - Increasing semaglutide dose to 2.4 mg (48 units) once a week  - Continuing calorie tracking and food journaling  - Ordering two more semaglutide vials before compounding ends on 4/22  - Exploring alternative medications for after compounding ends, will send future medication options via [employer]  _________________  PLAN:                            - Increase semaglutide to 2.4 mg (48 units) today.   - Use the current vial for the new dosage; it should provide enough for 3 total doses  - Order a new supply of medication in the first week of April and another close to the end of the third week  - Continue tracking your calorie intake and physical activity, especially as you increase your walking  - Maintain your food journal    Follow-Up: Tuesday, April 15th, at 9:00 AM to discuss your progress and plan post-medication strategies.    SUBJECTIVE/OBJECTIVE:                          Snow Hawthorne is a 64 year old female seen for a follow-up visit.       Reason for visit: Weight management follow-up.    Allergies/ADRs: Reviewed in chart  Past Medical History: Reviewed in chart  Tobacco: She reports that she has quit smoking. She has a 20 pack-year smoking history. She has never used smokeless tobacco.  Alcohol: not currently using    Medication Adherence/Access: no issues reported.    Patient consented to the use of [employer]ed to record and transcribe notes during this visit.    Weight  Management  - Semaglutide (compounded)  1.7 mg (34 units) once a week    Patient reports ongoing weight management efforts with medication  Experiences mild nausea, relieved by zelda and ondansetron if needed.  No severe nausea with current dose  Reports feeling full faster, eating less, and making healthier food choices with partner  She is aware of FDA change and no longer being able to get compounded GLP-1 medication after April 22. She would like to continue until then and consider options going forward.   Diet: Calorie tracking, Healthier snack choices, Eating less due to early satiety  Exercise:  Walking with neighbor, Store visits, Continues PT for hip  Medication History:  -phentermine- plateau after a couple months  - topiramate- memory issues    Initial consult weight: 240 lb per patient home weight before first dose  Current weight today: 220 lb  Cumulative Weight Loss: -20 lb, -8.3% from baseline    Today's Vitals: LMP 10/12/2003   ----------------      I spent 16 minutes with this patient today. All changes were made via collaborative practice agreement with Debbie Hernandez PA-C.     A summary of these recommendations was sent via [employer].    Juany Jeronimo, PharmD, BCACP  Medication Therapy Management Pharmacist  [facility]  [phone]      Telemedicine Visit Details  The patient's medications can be safely assessed via a telemedicine encounter.  Type of service:  Telephone visit  Originating Location (pt. Location): Home    Distant Location (provider location):  On-site  Start Time:  9:03 AM  End Time: 9:19 AM     Medication Therapy Recommendations  Class 3 severe obesity with body mass index (BMI) of 40.0 to 44.9 in adult, unspecified obesity type, unspecified whether serious comorbidity present (H)   1 Current Medication: Semaglutide-Weight Management (WEGOVY) 2.4 MG/0.75ML pen   Current Medication Sig: Inject 2.4 mg subcutaneously once a week. (Compounded)   Rationale: Dose too  low - Dosage too low - Effectiveness   Recommendation: Increase Dose - Semaglutide-Weight Management 2.4 MG/0.75ML pen   Status: Accepted per CPA   Identified Date: 3/17/2025 Completed Date: 3/17/2025

## 2025-04-04 PROCEDURE — 87077 CULTURE AEROBIC IDENTIFY: CPT | Mod: ORL | Performed by: OTOLARYNGOLOGY

## 2025-04-04 PROCEDURE — 87070 CULTURE OTHR SPECIMN AEROBIC: CPT | Mod: ORL | Performed by: OTOLARYNGOLOGY

## 2025-04-05 ENCOUNTER — LAB REQUISITION (OUTPATIENT)
Dept: LAB | Facility: CLINIC | Age: 65
End: 2025-04-05
Payer: MEDICARE

## 2025-04-05 DIAGNOSIS — H69.83 OTHER SPECIFIED DISORDERS OF EUSTACHIAN TUBE, BILATERAL: ICD-10-CM

## 2025-04-05 DIAGNOSIS — J32.2 CHRONIC ETHMOIDAL SINUSITIS: ICD-10-CM

## 2025-04-07 LAB
BACTERIA SPEC CULT: ABNORMAL
BACTERIA SPEC CULT: ABNORMAL

## 2025-04-15 ENCOUNTER — VIRTUAL VISIT (OUTPATIENT)
Dept: PHARMACY | Facility: PHYSICIAN GROUP | Age: 65
End: 2025-04-15
Payer: MEDICAID

## 2025-04-15 DIAGNOSIS — E66.813 CLASS 3 SEVERE OBESITY WITH BODY MASS INDEX (BMI) OF 40.0 TO 44.9 IN ADULT, UNSPECIFIED OBESITY TYPE, UNSPECIFIED WHETHER SERIOUS COMORBIDITY PRESENT: Primary | ICD-10-CM

## 2025-04-15 PROCEDURE — 99207 PR NO CHARGE LOS: CPT | Mod: 93 | Performed by: PHARMACIST

## 2025-04-15 NOTE — PROGRESS NOTES
Medication Therapy Management (MTM) Encounter    ASSESSMENT:                            Medication Adherence/Access: See below for considerations.  _  Weight Management  Cumulative Weight Loss: 26 lb, -11% from baseline  Goal to lose additional 20-30 lbs. Reports improved mobility but weather-related limitations on walking. Using food tracking wilian, adhering to restrictive diet (water only as liquid). Demonstrates understanding of addressing food cravings in moderation to prevent binge eating.  Patient would benefit from:  - Continue current compounded semaglutide for next 3-5 weeks (3 doses left in current vial)  - Will then transition to Zepbound (tirzepatide) via [employer] program    - Start at lowest dose for first 4 weeks due to increased potency    - Cost: first dose ~$349, subsequent doses $499  - Continue food tracking and diet adherence  _____________________  PLAN:                            Medications:  - Continue compounded semaglutide to 2.4 mg (48 units) once a week   - Call compounding pharmacy at the end of this week to see if you are able to get 1 more vial  - At next visit will order Zepbound vials through [facility]- see more info below about [facility] program    Weight Loss Recommendations:  - Continue with your current diet plan  - Use an wilian to track your food intake  - Continue to drink more water  - Satisfy food cravings with smaller portions to avoid binging  - Choose healthier options to satisfy cravings    Follow-up: Next appointment on May 7th at 9:00 AM    SUBJECTIVE/OBJECTIVE:                          Snow Hawthorne is a 64 year old female seen for a follow-up visit.       Reason for visit: Weight management follow-up.    Allergies/ADRs: Reviewed in chart  Past Medical History: Reviewed in chart  Tobacco: She reports that she has quit smoking. She has a 20 pack-year smoking history. She has never used smokeless tobacco.  Alcohol: not currently using    Medication  Adherence/Access:   Paying out-of-pocket for compounded semaglutide through [place] [facility] pharmacy. She is aware of the FDA guidance and the inability for pharmacies to compound GLP-1 medications starting April 22. She is interested in [facility] for Zepbound     Weight Management  - Semaglutide (compounded)  2.4 mg (48 units) once a week  Increased dose, is happy with weight loss results.   3 doses left in current vial  Plans to try to order 1 more vial before compounding ends.   Considering switch to Zepbound through [facility] next month- initial dose is affordable but she is not sure higher doses will be.   Initial Weight: 240 lbs  Previous visit Weight: 220 lbs  Current Weight: 214 lbs  Goal: Lose additional 20-30 lbs  Following restrictive diet due to allergies, tracking intake with wilian  Difficulty consuming adequate water (only allowed liquid)  Occasional small indulgences to prevent binging  Improved mobility, but weather limiting outdoor exercise  Medication History:  -phentermine- plateau after a couple months  - topiramate- memory issues    Today's Vitals: LMP 10/12/2003   ----------------      I spent 16 minutes with this patient today. All changes were made via collaborative practice agreement with Debbie Hernandez PA-C.     A summary of these recommendations was sent via [employer].    Juany Jeronimo, PharmD, BCACP  Medication Therapy Management Pharmacist  [facility]  [phone]      Telemedicine Visit Details  The patient's medications can be safely assessed via a telemedicine encounter.  Type of service:  Telephone visit  Originating Location (pt. Location): Home    Distant Location (provider location):  On-site  Start Time:  9:06 AM  End Time:  9:22 AM     Medication Therapy Recommendations  No medication therapy recommendations to display

## 2025-04-15 NOTE — PATIENT INSTRUCTIONS
"Recommendations from today's [facility] visit:                                                       Medications:  - Continue compounded semaglutide to 2.4 mg (48 units) once a week   - Call compounding pharmacy at the end of this week to see if you are able to get 1 more vial  - At next visit will order Zepbound vials through [facility]- see more info below about [facility] program    Weight Loss Recommendations:  - Continue with your current diet plan  - Use an wilian to track your food intake  - Continue to drink more water  - Satisfy food cravings with smaller portions to avoid binging  - Choose healthier options to satisfy cravings    Follow-up: Next appointment on May 7th at 9:00 AM    Zepbound Vials Through [facility]    Zepbound is now available as single use vials at the 2.5 mg, 5 mg, 7.5 mg and 10 mg only. The single-dose vials are only sold in packages of 4 vials and only offered at cash price from [facility]. The prescription for the vials will be sent to the pharmacy directly. There are no plans from the manufacture to offer higher dosing in vials. They will either email or text you when [facility] has obtained the prescription to start the process to mail the prescription to you. You need to answer the questions from the email or text to complete the mailing order. You will need to say \"yes\" to obtaining the administration supplies (needle/syringes and alcohol wipes) when purchasing the Zepbound vials from [facility] - they will ask you about this when you start your order with them.     Zepbound Vial Dosing   Initiate 2.5 mg subcutaneously once weekly for at least 4 weeks. Can further dose escalate to 5 mg once weekly for at least 4 weeks, then option to further increase to 7.5 mg once weekly for 4 weeks, then option to further increase to 10 mg once weekly.   When to escalate dosing is individualized for each patient and should be discussed " "between provider and patient. If you are feeling clinically effective response with little to no side effects, would recommend staying at the dose you are at.     Zepbound Vial Administration Video:   Go to the below link and select \"vial\" tab above video. Written instructions with pictures also available on website below video.   [email or web address]    Zepbound Storage and Stability:   Make sure that when you get the prescription that you store the Zepbound vials in the refrigerator (good until expiration date on vial under refrigerated temperature). Once it is time to do your injection, remove only the single-dose vial needed for your injection and keep other vials in the refrigerator until needed. Each single dose vial is good at room temperature for up to 21 days if necessary. If stored at room temperature, do not return to refrigerator. Discard vial if not used in 21 days after removing from refrigerator.     Zepbound Common Side Effects:   Nausea, diarrhea, constipation, headache, tiredness (fatigue), dizziness, stomach upset/pain. Less commonly, Wegovy can cause low blood sugar (symptoms: shaky, dizzy, sweaty, agitation). Please reach out to the care team should you feel like this is occurring. It is important to ensure that you are eating consistent meals and not skipping meals. Ensure you are getting at least 64 oz water daily.     Cost:   $349/4 single-dose 2.5 mg vials + $5 for administration supplies (4 needles/syringes, alcohol wipes)  $499/4 single-dose 5 mg vials + $5 for administration supplies (4 needles/syringes, alcohol wipes)  $499/4 single-dose 7.5 mg vials + $5 for administration supplies (4 needles/syringes, alcohol wipes) - so long as you fill every 45 day. If you fill outside of every 45 days, cost is $699 for 4 vials  $499/4 single-dose 10 mg vials + $5 for administration supplies (4 needles/syringes, alcohol wipes) - so long as you fill every 45 day. If you fill outside " of every 45 days, cost is $699 for 4 vials    More Information:   [email or web address]      It was great speaking with you today.  I value your experience and would be very thankful for your time in providing feedback in our clinic survey. In the next few days, you may receive an email or text message from [facility] [employer] with a link to a survey related to your clinical pharmacist.    To schedule another MTM appointment, please call [phone].    My Clinical Pharmacist's contact information:                                                      Please feel free to contact me with any questions or concerns you have.      Juany Jeronimo, PharmD, BCACP  Medication Therapy Management Pharmacist  [facility]  [phone]

## 2025-05-07 ENCOUNTER — VIRTUAL VISIT (OUTPATIENT)
Dept: PHARMACY | Facility: PHYSICIAN GROUP | Age: 65
End: 2025-05-07

## 2025-05-07 DIAGNOSIS — E66.813 CLASS 3 SEVERE OBESITY WITH BODY MASS INDEX (BMI) OF 40.0 TO 44.9 IN ADULT, UNSPECIFIED OBESITY TYPE, UNSPECIFIED WHETHER SERIOUS COMORBIDITY PRESENT (H): Primary | ICD-10-CM

## 2025-05-07 DIAGNOSIS — J32.9 CHRONIC SINUSITIS, UNSPECIFIED LOCATION: ICD-10-CM

## 2025-05-07 PROCEDURE — 99207 PR NO CHARGE LOS: CPT | Mod: 93 | Performed by: PHARMACIST

## 2025-05-07 NOTE — PROGRESS NOTES
Medication Therapy Management (MTM) Encounter    ASSESSMENT:                            Medication Access/Adherence  No issues identified.   _  Weight Management  No longer able to continue compounded semaglutide. Two weeks left of current supply. Satisfied with weight loss progress.   Patient would benefit from:  - Continue gradual food reintroduction as per allergist's instructions  - Increase fluid intake, including water and sugar-free electrolyte-containing beverages  - Monitor weight and intake when off GLP-1 medications  - Consider restarting low dose Zepbound through cash pay program if needed.  _  Chronic Sinusitis  Cough  Sinus infection since February, refractory to multiple courses of antibiotics.  ENT evaluation revealed a culture in the nose, which was treated.  ENT suggested surgery to remove bone for improved sinus drainage, targeting right sinus.  Hearing loss in left ear.  Allergy testing negative for traditional allergens.  Patient would benefit from:  - Continue doxycycline 100 mg PO BID for 7 more days (currently on day 3 of 10-day course)  - Continue nasal sprays as prescribed by allergist  - Continue Arnuity inhaler 1 puff daily   - Follow up with ENT regarding potential sinus surgery  - Okay to continue supplements: Probiotic, Magnesium, Digestive enzyme  - Monitor for improvement or worsening of sinus symptoms  __________________________  PLAN:                            - Finish last doses of compounded semaglutide, then stop  - Keep food journal before and after stopping to monitor portions and food changes  - Continue regimen recommended by allergist and ENT  - Aim to drink more water     Follow-up: Phone visit in 6 weeks, June 18th, to review progress    SUBJECTIVE/OBJECTIVE:                          Snow Hawthorne is a 64 year old female seen for a follow-up visit.       Reason for visit: Weight management follow-up.    Allergies/ADRs: Reviewed in chart  Past Medical History:  Reviewed in chart  Tobacco: She reports that she has quit smoking. She has a 20 pack-year smoking history. She has never used smokeless tobacco.  Alcohol: not currently using    Medication Adherence/Access:   She was paying out of pocket for compounded semaglutide through [facility]. No longer able to continue with FDA regulations.     Weight Management  - Semaglutide (compounded)  2.4 mg (48 units) once a week  She is finishing her last vial. She is happy with her weight loss results.   She had information about Zepbound [employer] cash option and may consider this. She would like to see how things go off medication.   Following restrictive diet due to allergies, tracking intake with wilian  Difficulty consuming adequate water (only allowed liquid)  Patient reports constipation, which may be related to inadequate water intake.  Medication History:  -phentermine- plateau after a couple months  - topiramate- memory issues    Sinus Infection  Cough  - Doxycycline: 10-day course, currently on day 3  - Arnuity inhaler 200 mcg/act 1 puff once daily   - Azelastine nasal spray  - Fluticasone nasal spray  - Albuterol inhaler  - Loratadine 10 mg once daily   Patient reports ongoing sinus infection since February (approximately three months) with severe cough. Cough feels like it's in the bronchials, causing aching. Cough temporarily resolved during previous course of doxycycline but returned after discontinuation. Previously completed a 20-day course of doxycycline and course of azithromycin. Lung capacity was found to be decreased during a breathing test performed by the allergist.  No known allergies found but feels like she has some allergy not identified (allergy testing came back negative)  Expresses feelings of confusion and frustration with the ongoing symptoms and multiple treatment approaches. Considering sinus surgery as suggested by the ENT but uncertain about potential outcomes and  risks.  Supplements recommended by allergist   - Probiotic  - Magnesium  - Digestive enzyme    Today's Vitals: LMP 10/12/2003   ----------------      I spent 25 minutes with this patient today. All changes were made via collaborative practice agreement with Debbie Hernandez PA-C.     A summary of these recommendations was sent via [employer].    Juany Jeronimo, PharmD, BCACP  Medication Therapy Management Pharmacist  [facility]  [phone]      Telemedicine Visit Details  The patient's medications can be safely assessed via a telemedicine encounter.  Type of service:  Telephone visit  Originating Location (pt. Location): Home    Distant Location (provider location):  On-site  Start Time: 9:10 AM  End Time: 9:35 AM     Medication Therapy Recommendations  No medication therapy recommendations to display

## 2025-05-12 RX ORDER — FLUTICASONE PROPIONATE 50 MCG
1 SPRAY, SUSPENSION (ML) NASAL DAILY
COMMUNITY

## 2025-05-12 RX ORDER — FLUTICASONE FUROATE 200 UG/1
1 POWDER RESPIRATORY (INHALATION) DAILY
COMMUNITY
Start: 2025-04-09

## 2025-05-12 RX ORDER — AZELASTINE HYDROCHLORIDE 137 UG/1
1 SPRAY, METERED NASAL 2 TIMES DAILY
COMMUNITY

## 2025-05-12 RX ORDER — MULTIVITAMIN WITH IRON
1 TABLET ORAL DAILY
COMMUNITY

## 2025-05-12 RX ORDER — ZINC OXIDE 13 %
1 CREAM (GRAM) TOPICAL DAILY
COMMUNITY

## 2025-05-12 RX ORDER — NIACINAMIDE 500 MG
1 TABLET, EXTENDED RELEASE ORAL DAILY
COMMUNITY

## 2025-05-12 NOTE — PATIENT INSTRUCTIONS
Recommendations from today's MTM visit:                                                       - Finish last doses of compounded semaglutide, then stop  - Keep food journal before and after stopping to monitor portions and food changes  - Continue regimen recommended by allergist and ENT  - Aim to drink more water     Follow-up: Phone visit in 6 weeks, June 18th, to review progress    It was great speaking with you today.  I value your experience and would be very thankful for your time in providing feedback in our clinic survey. In the next few days, you may receive an email or text message from [email or web address] with a link to a survey related to your clinical pharmacist.    To schedule another MTM appointment, please call [phone].    My Clinical Pharmacist's contact information:                                                      Please feel free to contact me with any questions or concerns you have.      Juany Jeronimo, PharmD, BCACP  Medication Therapy Management Pharmacist  [facility]  [phone]

## 2025-05-24 ENCOUNTER — HEALTH MAINTENANCE LETTER (OUTPATIENT)
Age: 65
End: 2025-05-24

## 2025-06-18 ENCOUNTER — VIRTUAL VISIT (OUTPATIENT)
Dept: PHARMACY | Facility: PHYSICIAN GROUP | Age: 65
End: 2025-06-18
Payer: MEDICARE

## 2025-06-18 DIAGNOSIS — E66.813 CLASS 3 SEVERE OBESITY WITH BODY MASS INDEX (BMI) OF 40.0 TO 44.9 IN ADULT, UNSPECIFIED OBESITY TYPE, UNSPECIFIED WHETHER SERIOUS COMORBIDITY PRESENT (H): Primary | ICD-10-CM

## 2025-06-18 NOTE — PROGRESS NOTES
Medication Therapy Management (MTM) Encounter    ASSESSMENT:                            Medication Adherence/Access: Cost and coverage limitations are a barrier to GLP-1 access.  _  Weight Management  Cumulative Weight Loss: 29-33 lb, -12-14% from baseline  Mild weight regain post-semaglutide discontinuation.   Recent overeating due to events.   Reports portion control issues and increased food thoughts without medication.  Tracking calories, using protein shakes.   Motivated by others noticing weight loss.  Previous oral options not successful or contraindicated.   Patient would benefit from:  - Continue daily calorie tracking  - Manage psychological hunger:    - Water intake for cravings    - Distraction techniques (e.g., walk around house)  - Increase physical activity: Walk with friend  _____________________  PLAN:                            - Maintain the diet changes you had while on semaglutide  - Continue tracking everything you eat daily to control calories  - Drink a full glass of water as a distraction when feeling hungry  - Take a lap around the house when feeling hungry  - Consider walking with a friend for motivation  - Stay active at home and try to do more for weight loss    Follow-up: Phone call appointment on Tuesday, July 22nd at 9:00 AM to discuss your progress and next steps    SUBJECTIVE/OBJECTIVE:                          Snow Hawthorne is a 64 year old female seen for a follow-up visit.       Reason for visit: Weight management follow-up.    Allergies/ADRs: Reviewed in chart  Past Medical History: Reviewed in chart  Tobacco: She reports that she has quit smoking. She has a 20 pack-year smoking history. She has never used smokeless tobacco.  Alcohol: not currently using    Medication Adherence/Access:   Insurance does not cover GLP-1 for weight loss.   Previously paying out-of-pocket for compounded semaglutide through [facility] but no longer available with FDA  regulations.   Opted not to move forward with other cash pay options due to cost at this time.     Weight Management  Recently discontinued semaglutide (compounded)- see above  Reports difficulty maintaining weight loss since discontinuation  Challenges:  - Adherence to diet changes without medication  - Overeating during recent events (graduation, Father's Day)  - Increased food thoughts, decreased satiety  - Recognizes need for more exercise  Positives:  - Tracks daily food intake (except 2 event days)  - Considering walking with friend for motivation  - Positive feedback on weight loss from others  - Desires to attempt management without medication, open to resuming if needed  Diet:   Recent overeating events, tracking intake, started protein shakes  Investigating food allergies (aged cheese may cause issues, American cheese tolerated)  Exercise:   Home activity insufficient, plans to increase  Considering walking with friend for motivation    Weight prior to starting semaglutide: 240 lb  Current weight: 207-211    Previous medication trials:  -phentermine- plateau after a couple months  - topiramate- memory issues  - metformin- didn't help    Today's Vitals: LMP 10/12/2003   ----------------      I spent 13 minutes with this patient today. All changes were made via collaborative practice agreement with Debbie Hernandez PA-C.     A summary of these recommendations was sent via [employer].    Juany Jeronimo, PharmD, BCACP  Medication Therapy Management Pharmacist  [facility]  [phone]      Telemedicine Visit Details  The patient's medications can be safely assessed via a telemedicine encounter.  Type of service:  Telephone visit  Originating Location (pt. Location): Home    Distant Location (provider location):  On-site  Start Time: 9:06 AM  End Time: 9:19 AM     Medication Therapy Recommendations  No medication therapy recommendations to display

## 2025-06-18 NOTE — PATIENT INSTRUCTIONS
Recommendations from today's MTM visit:                                                       - Maintain the diet changes you had while on semaglutide  - Continue tracking everything you eat daily to control calories  - Drink a full glass of water as a distraction when feeling hungry  - Take a lap around the house when feeling hungry  - Consider walking with a friend for motivation  - Stay active at home and try to do more for weight loss    Follow-up: Phone call appointment on Tuesday, July 22nd at 9:00 AM to discuss your progress and next steps    It was great speaking with you today.  I value your experience and would be very thankful for your time in providing feedback in our clinic survey. In the next few days, you may receive an email or text message from [employer] with a link to a survey related to your clinical pharmacist.    To schedule another MTM appointment, please call [phone].    My Clinical Pharmacist's contact information:                                                      Please feel free to contact me with any questions or concerns you have.      Juany Jeronimo, PharmD, BCACP  Medication Therapy Management Pharmacist  [facility]  [phone]

## 2025-07-29 ENCOUNTER — LAB REQUISITION (OUTPATIENT)
Dept: LAB | Facility: CLINIC | Age: 65
End: 2025-07-29
Payer: MEDICARE

## 2025-07-29 DIAGNOSIS — E55.9 VITAMIN D DEFICIENCY, UNSPECIFIED: ICD-10-CM

## 2025-07-29 LAB
ALBUMIN SERPL BCG-MCNC: 4.2 G/DL (ref 3.5–5.2)
ALP SERPL-CCNC: 122 U/L (ref 40–150)
ALT SERPL W P-5'-P-CCNC: 14 U/L (ref 0–50)
ANION GAP SERPL CALCULATED.3IONS-SCNC: 13 MMOL/L (ref 7–15)
AST SERPL W P-5'-P-CCNC: 16 U/L (ref 0–45)
BILIRUB SERPL-MCNC: <0.2 MG/DL
BUN SERPL-MCNC: 11.2 MG/DL (ref 8–23)
CALCIUM SERPL-MCNC: 9.5 MG/DL (ref 8.8–10.4)
CHLORIDE SERPL-SCNC: 102 MMOL/L (ref 98–107)
CREAT SERPL-MCNC: 0.63 MG/DL (ref 0.51–0.95)
EGFRCR SERPLBLD CKD-EPI 2021: >90 ML/MIN/1.73M2
GLUCOSE SERPL-MCNC: 81 MG/DL (ref 70–99)
HCO3 SERPL-SCNC: 24 MMOL/L (ref 22–29)
POTASSIUM SERPL-SCNC: 4.3 MMOL/L (ref 3.4–5.3)
PROT SERPL-MCNC: 6.5 G/DL (ref 6.4–8.3)
SODIUM SERPL-SCNC: 139 MMOL/L (ref 135–145)
TSH SERPL DL<=0.005 MIU/L-ACNC: 0.64 UIU/ML (ref 0.3–4.2)
VIT B12 SERPL-MCNC: 385 PG/ML (ref 232–1245)
VIT D+METAB SERPL-MCNC: 27 NG/ML (ref 20–50)

## 2025-07-29 PROCEDURE — 82306 VITAMIN D 25 HYDROXY: CPT | Mod: ORL | Performed by: PHYSICIAN ASSISTANT

## 2025-07-29 PROCEDURE — 84443 ASSAY THYROID STIM HORMONE: CPT | Mod: ORL | Performed by: PHYSICIAN ASSISTANT

## 2025-07-29 PROCEDURE — 82607 VITAMIN B-12: CPT | Mod: ORL | Performed by: PHYSICIAN ASSISTANT

## 2025-07-29 PROCEDURE — 80053 COMPREHEN METABOLIC PANEL: CPT | Mod: ORL | Performed by: PHYSICIAN ASSISTANT

## 2025-08-12 ENCOUNTER — VIRTUAL VISIT (OUTPATIENT)
Dept: PHARMACY | Facility: PHYSICIAN GROUP | Age: 65
End: 2025-08-12
Payer: MEDICAID

## 2025-08-12 DIAGNOSIS — E66.01 CLASS 2 SEVERE OBESITY WITH SERIOUS COMORBIDITY AND BODY MASS INDEX (BMI) OF 38.0 TO 38.9 IN ADULT, UNSPECIFIED OBESITY TYPE (H): Primary | ICD-10-CM

## 2025-08-12 DIAGNOSIS — E66.812 CLASS 2 SEVERE OBESITY WITH SERIOUS COMORBIDITY AND BODY MASS INDEX (BMI) OF 38.0 TO 38.9 IN ADULT, UNSPECIFIED OBESITY TYPE (H): Primary | ICD-10-CM

## 2025-08-12 PROCEDURE — 99207 PR NO CHARGE LOS: CPT | Mod: 93 | Performed by: PHARMACIST

## 2025-09-02 ENCOUNTER — VIRTUAL VISIT (OUTPATIENT)
Dept: PHARMACY | Facility: PHYSICIAN GROUP | Age: 65
End: 2025-09-02
Payer: MEDICAID

## 2025-09-02 DIAGNOSIS — E66.01 CLASS 2 SEVERE OBESITY WITH SERIOUS COMORBIDITY AND BODY MASS INDEX (BMI) OF 38.0 TO 38.9 IN ADULT, UNSPECIFIED OBESITY TYPE (H): Primary | ICD-10-CM

## 2025-09-02 DIAGNOSIS — E66.812 CLASS 2 SEVERE OBESITY WITH SERIOUS COMORBIDITY AND BODY MASS INDEX (BMI) OF 38.0 TO 38.9 IN ADULT, UNSPECIFIED OBESITY TYPE (H): Primary | ICD-10-CM

## 2025-09-02 PROCEDURE — 99207 PR NO CHARGE LOS: CPT | Mod: 93 | Performed by: PHARMACIST

## (undated) RX ORDER — BUPIVACAINE HYDROCHLORIDE 2.5 MG/ML
INJECTION, SOLUTION EPIDURAL; INFILTRATION; INTRACAUDAL
Status: DISPENSED
Start: 2020-09-25

## (undated) RX ORDER — BUPIVACAINE HYDROCHLORIDE 5 MG/ML
INJECTION, SOLUTION EPIDURAL; INTRACAUDAL
Status: DISPENSED
Start: 2020-02-21

## (undated) RX ORDER — TRIAMCINOLONE ACETONIDE 40 MG/ML
INJECTION, SUSPENSION INTRA-ARTICULAR; INTRAMUSCULAR
Status: DISPENSED
Start: 2020-02-21

## (undated) RX ORDER — BUPIVACAINE HYDROCHLORIDE 2.5 MG/ML
INJECTION, SOLUTION EPIDURAL; INFILTRATION; INTRACAUDAL
Status: DISPENSED
Start: 2022-03-22

## (undated) RX ORDER — BUPIVACAINE HYDROCHLORIDE 5 MG/ML
INJECTION, SOLUTION EPIDURAL; INTRACAUDAL
Status: DISPENSED
Start: 2019-10-25

## (undated) RX ORDER — TRIAMCINOLONE ACETONIDE 40 MG/ML
INJECTION, SUSPENSION INTRA-ARTICULAR; INTRAMUSCULAR
Status: DISPENSED
Start: 2019-10-25

## (undated) RX ORDER — TRIAMCINOLONE ACETONIDE 40 MG/ML
INJECTION, SUSPENSION INTRA-ARTICULAR; INTRAMUSCULAR
Status: DISPENSED
Start: 2022-03-22

## (undated) RX ORDER — TRIAMCINOLONE ACETONIDE 40 MG/ML
INJECTION, SUSPENSION INTRA-ARTICULAR; INTRAMUSCULAR
Status: DISPENSED
Start: 2020-09-25